# Patient Record
Sex: FEMALE | Race: WHITE | NOT HISPANIC OR LATINO | URBAN - METROPOLITAN AREA
[De-identification: names, ages, dates, MRNs, and addresses within clinical notes are randomized per-mention and may not be internally consistent; named-entity substitution may affect disease eponyms.]

---

## 2017-04-20 ENCOUNTER — OFFICE VISIT (OUTPATIENT)
Dept: FAMILY MEDICINE | Facility: CLINIC | Age: 27
End: 2017-04-20
Payer: COMMERCIAL

## 2017-04-20 VITALS
OXYGEN SATURATION: 98 % | WEIGHT: 163 LBS | HEART RATE: 71 BPM | HEIGHT: 69 IN | DIASTOLIC BLOOD PRESSURE: 70 MMHG | BODY MASS INDEX: 24.14 KG/M2 | TEMPERATURE: 98.4 F | SYSTOLIC BLOOD PRESSURE: 102 MMHG | RESPIRATION RATE: 19 BRPM

## 2017-04-20 DIAGNOSIS — K64.4 EXTERNAL HEMORRHOIDS: Primary | ICD-10-CM

## 2017-04-20 PROCEDURE — 99203 OFFICE O/P NEW LOW 30 MIN: CPT | Performed by: PHYSICIAN ASSISTANT

## 2017-04-20 NOTE — NURSING NOTE
"Chief Complaint   Patient presents with     Gastrointestinal Problem       Initial /70 (BP Location: Left arm, Patient Position: Chair, Cuff Size: Adult Regular)  Pulse 71  Temp 98.4  F (36.9  C) (Oral)  Resp 19  Ht 5' 8.9\" (1.75 m)  Wt 163 lb (73.9 kg)  SpO2 98%  BMI 24.14 kg/m2 Estimated body mass index is 24.14 kg/(m^2) as calculated from the following:    Height as of this encounter: 5' 8.9\" (1.75 m).    Weight as of this encounter: 163 lb (73.9 kg).  Medication Reconciliation: complete     Tracy Martin CMA  "

## 2017-04-20 NOTE — PATIENT INSTRUCTIONS
Encouraged high fiber diet with increase fluids and water.  May use over the counter stool softners- colace and/or senokot.  Anusol HC creams or wipes as needed.  Importance of sitz baths with epsum salts discussed at length.  Return to clinic as needed or with any worsening or changes in symptoms.   Treating Hemorrhoids: Self-Care  Follow your doctor s advice about caring for your hemorrhoids at home. Some treatments help relieve symptoms right away. Others involve making changes in your diet and exercise habits. These can help ease constipation and prevent hemorrhoid symptoms from coming back.    Relieving Symptoms  Your doctor may prescribe anti-inflammatory medication to help ease your symptoms. The following tips will also help relieve pain and swelling.    Take sitz baths. Taking a sitz bath means sitting in a few inches of warm bath water. Soaking for 10 minutes twice a day can provide welcome relief from painful hemorrhoids. It can also help the area stay clean.    Develop good bowel habits. Use the bathroom when you need to. Don t ignore the urge to move your bowels. This can lead to constipation, hard stools, and straining. Also, don t read while on the toilet. Sit only as long as needed. Wipe gently with soft, unscented toilet tissue or baby wipes.    Use ice packs. Placing an ice pack on a thrombosed external hemorrhoid can help relieve pain right away. It will also help reduce the blood clot. Use the ice for 15-20 minutes at a time. Keep a cloth between the ice and your skin to prevent skin damage.    Use other measures. Laxatives and enemas can help ease constipation. But use them only on your doctor s advice. For symptom relief, try using cotton pads soaked in witch hazel. These are available at most drugstores. Over-the-counter hemorrhoid ointments and petroleum jelly can also provide relief.  Add Fiber to Your Diet  Adding fiber to your diet can help relieve constipation by making stools softer and  easier to pass. To increase your fiber intake, your doctor may recommend a bulking agent, such as psyllium. This is a high-fiber supplement available at most grocery stores and drugstores. Eating more fiber-rich foods will also help. There are two types of fiber:    Insoluble fiber is the main ingredient in bulking agents. It s also found in foods such as wheat bran, whole-grain breads, fresh fruits, and vegetables.    Soluble fiber is found in foods such as oat bran. Although soluble fiber is good for you, it may not ease constipation as much as foods high in insoluble fiber.  Drink More Water  Along with a high-fiber diet, drinking more water can help ease constipation. This is because insoluble fiber absorbs water, making stools soft and bulky. Be sure to drink plenty of water throughout the day. Drinking fruit juices, such as prune juice or apple juice, can also help prevent constipation.  Get More Exercise  Regular exercise aids digestion and helps prevent constipation. It s also great for your health. So talk with your doctor about starting an exercise program. Low-impact activities, such as swimming or walking, are good places to start. Take it easy at first. And remember to drink plenty of water when you exercise.  High-Fiber Foods  High-fiber foods offer many benefits. By making your stools softer, they help heal and prevent swollen hemorrhoids. They may also help reduce the risk of colon and rectal cancer. Best of all, they re usually low in calories and taste great. Here are some examples of fiber-rich foods.    Whole grains, such as wheat bran, corn bran, and brown rice.    Vegetables, especially carrots, broccoli, cabbage, and peas.    Fruits, such as apples, bananas, raisins, peaches, and pears.    Nuts and legumes, especially peanuts, lentils, and kidney beans.  Easy Ways to Add Fiber  The tips below offer some simple ways to add more high-fiber foods to your meals.    Start your day with a high-fiber  breakfast. Eat a wheat bran cereal along with a sliced banana. Or, try peanut butter on whole-wheat toast.    Eat carrot sticks for snacks. They re easy to prepare, taste great, and are low in calories.    Use whole-grain breads instead of white bread for sandwiches.    Eat fruits for treats. Try an apple and some raisins instead of a candy bar.     5540-8718 [employer]. [address] [place] [address], [place] [address]. All rights reserved. This information is not intended as a substitute for professional medical care. Always follow your healthcare professional's instructions.

## 2017-04-20 NOTE — PROGRESS NOTES
"  SUBJECTIVE:                                                    Sadie Bishop is a 26 year old female who presents to clinic today for the following health issues:    Gastrointestinal symptoms      Duration: 6 months    Description:           Had blood in stool one time but not since then. No abdominal pain. Anal gets itchy sometimes.     Intensity:  mild    Accompanying signs and symptoms:  BMs soft and firm, no real constipation; painful bowel movements    History  Previous/similar problem: no   Previous evaluation:  none    Aggravating factors: none    Alleviating factors: nothing    Other Therapies tried: over the counter cream with no real changes    No vaginal symptoms or discharge.     Patient sees gyne regularly - last pap 1.5 years, has appointment in a few weeks for follow up.  No current std/hiv concerns.      Problem list and histories reviewed & adjusted, as indicated.  Additional history: as documented    Patient Active Problem List   Diagnosis     Thumb injury     Closed fracture of base of thumb (first) metacarpal     History reviewed. No pertinent surgical history.    Social History   Substance Use Topics     Smoking status: Never Smoker     Smokeless tobacco: Never Used     Alcohol use Yes      Comment: ocassionally     History reviewed. No pertinent family history.      No current outpatient prescriptions on file.     No Known Allergies    Reviewed and updated as needed this visit by clinical staff  Tobacco  Allergies  Meds  Problems  Med Hx  Surg Hx  Fam Hx  Soc Hx        Reviewed and updated as needed this visit by Provider  Allergies  Meds  Problems         ROS:  Constitutional, HEENT, cardiovascular, pulmonary, gi and gu systems are negative, except as otherwise noted.    OBJECTIVE:                                                    /70 (BP Location: Left arm, Patient Position: Chair, Cuff Size: Adult Regular)  Pulse 71  Temp 98.4  F (36.9  C) (Oral)  Resp 19  Ht 5' 8.9\" " (1.75 m)  Wt 163 lb (73.9 kg)  SpO2 98%  BMI 24.14 kg/m2  Body mass index is 24.14 kg/(m^2).  GENERAL: healthy, alert and no distress  RESP: lungs clear to auscultation - no rales, rhonchi or wheezes  CV: regular rate and rhythm, normal S1 S2, no S3 or S4, no murmur, click or rub, no peripheral edema and peripheral pulses strong  ABDOMEN: soft, nontender, no hepatosplenomegaly, no masses and bowel sounds normal  RECTAL (female): normal sphincter tone, no rectal masses; small, well healing, non-inflamed hemorrhoid at 6' o clock location.  PSYCH: mentation appears normal, affect normal/bright    Diagnostic Test Results:  none      ASSESSMENT/PLAN:                                                        ICD-10-CM    1. External hemorrhoids K64.4        Patient Instructions     Encouraged high fiber diet with increase fluids and water.  May use over the counter stool softners- colace and/or senokot.  Anusol HC creams or wipes as needed.  Importance of sitz baths with epsum salts discussed at length.  Return to clinic as needed or with any worsening or changes in symptoms.   Treating Hemorrhoids: Self-Care  Follow your doctor s advice about caring for your hemorrhoids at home. Some treatments help relieve symptoms right away. Others involve making changes in your diet and exercise habits. These can help ease constipation and prevent hemorrhoid symptoms from coming back.    Relieving Symptoms  Your doctor may prescribe anti-inflammatory medication to help ease your symptoms. The following tips will also help relieve pain and swelling.    Take sitz baths. Taking a sitz bath means sitting in a few inches of warm bath water. Soaking for 10 minutes twice a day can provide welcome relief from painful hemorrhoids. It can also help the area stay clean.    Develop good bowel habits. Use the bathroom when you need to. Don t ignore the urge to move your bowels. This can lead to constipation, hard stools, and straining. Also,  don t read while on the toilet. Sit only as long as needed. Wipe gently with soft, unscented toilet tissue or baby wipes.    Use ice packs. Placing an ice pack on a thrombosed external hemorrhoid can help relieve pain right away. It will also help reduce the blood clot. Use the ice for 15-20 minutes at a time. Keep a cloth between the ice and your skin to prevent skin damage.    Use other measures. Laxatives and enemas can help ease constipation. But use them only on your doctor s advice. For symptom relief, try using cotton pads soaked in witch hazel. These are available at most drugstores. Over-the-counter hemorrhoid ointments and petroleum jelly can also provide relief.  Add Fiber to Your Diet  Adding fiber to your diet can help relieve constipation by making stools softer and easier to pass. To increase your fiber intake, your doctor may recommend a bulking agent, such as psyllium. This is a high-fiber supplement available at most grocery stores and drugstores. Eating more fiber-rich foods will also help. There are two types of fiber:    Insoluble fiber is the main ingredient in bulking agents. It s also found in foods such as wheat bran, whole-grain breads, fresh fruits, and vegetables.    Soluble fiber is found in foods such as oat bran. Although soluble fiber is good for you, it may not ease constipation as much as foods high in insoluble fiber.  Drink More Water  Along with a high-fiber diet, drinking more water can help ease constipation. This is because insoluble fiber absorbs water, making stools soft and bulky. Be sure to drink plenty of water throughout the day. Drinking fruit juices, such as prune juice or apple juice, can also help prevent constipation.  Get More Exercise  Regular exercise aids digestion and helps prevent constipation. It s also great for your health. So talk with your doctor about starting an exercise program. Low-impact activities, such as swimming or walking, are good places to  start. Take it easy at first. And remember to drink plenty of water when you exercise.  High-Fiber Foods  High-fiber foods offer many benefits. By making your stools softer, they help heal and prevent swollen hemorrhoids. They may also help reduce the risk of colon and rectal cancer. Best of all, they re usually low in calories and taste great. Here are some examples of fiber-rich foods.    Whole grains, such as wheat bran, corn bran, and brown rice.    Vegetables, especially carrots, broccoli, cabbage, and peas.    Fruits, such as apples, bananas, raisins, peaches, and pears.    Nuts and legumes, especially peanuts, lentils, and kidney beans.  Easy Ways to Add Fiber  The tips below offer some simple ways to add more high-fiber foods to your meals.    Start your day with a high-fiber breakfast. Eat a wheat bran cereal along with a sliced banana. Or, try peanut butter on whole-wheat toast.    Eat carrot sticks for snacks. They re easy to prepare, taste great, and are low in calories.    Use whole-grain breads instead of white bread for sandwiches.    Eat fruits for treats. Try an apple and some raisins instead of a candy bar.     5887-4901 [employer]. [address] [place] [address], [place] [address]. All rights reserved. This information is not intended as a substitute for professional medical care. Always follow your healthcare professional's instructions.      Adore Phillips PA-C  [facility]

## 2017-04-20 NOTE — MR AVS SNAPSHOT
After Visit Summary   4/20/2017    Sadie Bishop    MRN: [id number]           Patient Information     Date Of Birth          1990        Visit Information        Provider Department      4/20/2017 4:00 PM Adore Phillips PA-C [facility]        Care Instructions    Encouraged high fiber diet with increase fluids and water.  May use over the counter stool softners- colace and/or senokot.  Anusol HC creams or wipes as needed.  Importance of sitz baths with epsum salts discussed at length.  Return to clinic as needed or with any worsening or changes in symptoms.   Treating Hemorrhoids: Self-Care  Follow your doctor s advice about caring for your hemorrhoids at home. Some treatments help relieve symptoms right away. Others involve making changes in your diet and exercise habits. These can help ease constipation and prevent hemorrhoid symptoms from coming back.    Relieving Symptoms  Your doctor may prescribe anti-inflammatory medication to help ease your symptoms. The following tips will also help relieve pain and swelling.    Take sitz baths. Taking a sitz bath means sitting in a few inches of warm bath water. Soaking for 10 minutes twice a day can provide welcome relief from painful hemorrhoids. It can also help the area stay clean.    Develop good bowel habits. Use the bathroom when you need to. Don t ignore the urge to move your bowels. This can lead to constipation, hard stools, and straining. Also, don t read while on the toilet. Sit only as long as needed. Wipe gently with soft, unscented toilet tissue or baby wipes.    Use ice packs. Placing an ice pack on a thrombosed external hemorrhoid can help relieve pain right away. It will also help reduce the blood clot. Use the ice for 15-20 minutes at a time. Keep a cloth between the ice and your skin to prevent skin damage.    Use other measures. Laxatives and enemas can help ease constipation. But use them only on your  doctor s advice. For symptom relief, try using cotton pads soaked in witch hazel. These are available at most drugstores. Over-the-counter hemorrhoid ointments and petroleum jelly can also provide relief.  Add Fiber to Your Diet  Adding fiber to your diet can help relieve constipation by making stools softer and easier to pass. To increase your fiber intake, your doctor may recommend a bulking agent, such as psyllium. This is a high-fiber supplement available at most grocery stores and drugstores. Eating more fiber-rich foods will also help. There are two types of fiber:    Insoluble fiber is the main ingredient in bulking agents. It s also found in foods such as wheat bran, whole-grain breads, fresh fruits, and vegetables.    Soluble fiber is found in foods such as oat bran. Although soluble fiber is good for you, it may not ease constipation as much as foods high in insoluble fiber.  Drink More Water  Along with a high-fiber diet, drinking more water can help ease constipation. This is because insoluble fiber absorbs water, making stools soft and bulky. Be sure to drink plenty of water throughout the day. Drinking fruit juices, such as prune juice or apple juice, can also help prevent constipation.  Get More Exercise  Regular exercise aids digestion and helps prevent constipation. It s also great for your health. So talk with your doctor about starting an exercise program. Low-impact activities, such as swimming or walking, are good places to start. Take it easy at first. And remember to drink plenty of water when you exercise.  High-Fiber Foods  High-fiber foods offer many benefits. By making your stools softer, they help heal and prevent swollen hemorrhoids. They may also help reduce the risk of colon and rectal cancer. Best of all, they re usually low in calories and taste great. Here are some examples of fiber-rich foods.    Whole grains, such as wheat bran, corn bran, and brown rice.    Vegetables, especially  "carrots, broccoli, cabbage, and peas.    Fruits, such as apples, bananas, raisins, peaches, and pears.    Nuts and legumes, especially peanuts, lentils, and kidney beans.  Easy Ways to Add Fiber  The tips below offer some simple ways to add more high-fiber foods to your meals.    Start your day with a high-fiber breakfast. Eat a wheat bran cereal along with a sliced banana. Or, try peanut butter on whole-wheat toast.    Eat carrot sticks for snacks. They re easy to prepare, taste great, and are low in calories.    Use whole-grain breads instead of white bread for sandwiches.    Eat fruits for treats. Try an apple and some raisins instead of a candy bar.     3888-1852 [employer]. [address] [place] [address]. All rights reserved. This information is not intended as a substitute for professional medical care. Always follow your healthcare professional's instructions.        Follow-ups after your visit        Who to contact     If you have questions or need follow up information about today's clinic visit or your schedule please contact [facility] directly at [phone].  Normal or non-critical lab and imaging results will be communicated to you by MyChart, letter or phone within 4 business days after the clinic has received the results. If you do not hear from us within 7 days, please contact the clinic through eelusionhart or phone. If you have a critical or abnormal lab result, we will notify you by phone as soon as possible.  Submit refill requests through [employer] or call your pharmacy and they will forward the refill request to us. Please allow 3 business days for your refill to be completed.          Additional Information About Your Visit        eelusion[employer] Information     [employer] lets you send messages to your doctor, view your test results, renew your prescriptions, schedule appointments and more. To sign up, go to [email or web address] . Click on \"Log in\" on the left " "side of the screen, which will take you to the Welcome page. Then click on \"Sign up Now\" on the right side of the page.     You will be asked to enter the access code listed below, as well as some personal information. Please follow the directions to create your username and password.     Your access code is: [email or web address]  Expires: 2017  3:57 PM     Your access code will  in 90 days. If you need help or a new code, please call your [facility] clinic or [phone].        Care EveryWhere ID     This is your Care EveryWhere ID. This could be used by other organizations to access your [facility] medical records  [id number]        Your Vitals Were     Pulse Temperature Respirations Height Pulse Oximetry BMI (Body Mass Index)    71 98.4  F (36.9  C) (Oral) 19 5' 8.9\" (1.75 m) 98% 24.14 kg/m2       Blood Pressure from Last 3 Encounters:   17 102/70    Weight from Last 3 Encounters:   17 163 lb (73.9 kg)   13 155 lb (70.3 kg)   13 155 lb (70.3 kg)              Today, you had the following     No orders found for display       Primary Care Provider    None Specified       No primary provider on file.        Thank you!     Thank you for choosing [facility]  for your care. Our goal is always to provide you with excellent care. Hearing back from our patients is one way we can continue to improve our services. Please take a few minutes to complete the written survey that you may receive in the mail after your visit with us. Thank you!             Your Updated Medication List - Protect others around you: Learn how to safely use, store and throw away your medicines at www.disposemymeds.org.      Notice  As of 2017  4:14 PM    You have not been prescribed any medications.      "

## 2017-06-23 ENCOUNTER — OFFICE VISIT (OUTPATIENT)
Dept: FAMILY MEDICINE | Facility: CLINIC | Age: 27
End: 2017-06-23
Payer: COMMERCIAL

## 2017-06-23 VITALS
TEMPERATURE: 98.6 F | HEART RATE: 63 BPM | SYSTOLIC BLOOD PRESSURE: 104 MMHG | DIASTOLIC BLOOD PRESSURE: 66 MMHG | WEIGHT: 163.25 LBS | RESPIRATION RATE: 20 BRPM | BODY MASS INDEX: 24.18 KG/M2 | OXYGEN SATURATION: 99 %

## 2017-06-23 DIAGNOSIS — K64.4 EXTERNAL HEMORRHOIDS: Primary | ICD-10-CM

## 2017-06-23 PROCEDURE — 99213 OFFICE O/P EST LOW 20 MIN: CPT | Performed by: PHYSICIAN ASSISTANT

## 2017-06-23 NOTE — PATIENT INSTRUCTIONS
Referral updated.    Encouraged high fiber diet with increase fluids and water.  May use over the counter stool softners- colace and/or senokot.  Anusol HC creams or wipes as needed.  Importance of sitz baths with epsum salts discussed at length.  Return to clinic as needed or with any worsening or changes in symptoms.   Treating Hemorrhoids: Self-Care  Follow your doctor s advice about caring for your hemorrhoids at home. Some treatments help relieve symptoms right away. Others involve making changes in your diet and exercise habits. These can help ease constipation and prevent hemorrhoid symptoms from coming back.    Relieving Symptoms  Your doctor may prescribe anti-inflammatory medication to help ease your symptoms. The following tips will also help relieve pain and swelling.    Take sitz baths. Taking a sitz bath means sitting in a few inches of warm bath water. Soaking for 10 minutes twice a day can provide welcome relief from painful hemorrhoids. It can also help the area stay clean.    Develop good bowel habits. Use the bathroom when you need to. Don t ignore the urge to move your bowels. This can lead to constipation, hard stools, and straining. Also, don t read while on the toilet. Sit only as long as needed. Wipe gently with soft, unscented toilet tissue or baby wipes.    Use ice packs. Placing an ice pack on a thrombosed external hemorrhoid can help relieve pain right away. It will also help reduce the blood clot. Use the ice for 15-20 minutes at a time. Keep a cloth between the ice and your skin to prevent skin damage.    Use other measures. Laxatives and enemas can help ease constipation. But use them only on your doctor s advice. For symptom relief, try using cotton pads soaked in witch hazel. These are available at most drugstores. Over-the-counter hemorrhoid ointments and petroleum jelly can also provide relief.  Add Fiber to Your Diet  Adding fiber to your diet can help relieve constipation by  making stools softer and easier to pass. To increase your fiber intake, your doctor may recommend a bulking agent, such as psyllium. This is a high-fiber supplement available at most grocery stores and drugstores. Eating more fiber-rich foods will also help. There are two types of fiber:    Insoluble fiber is the main ingredient in bulking agents. It s also found in foods such as wheat bran, whole-grain breads, fresh fruits, and vegetables.    Soluble fiber is found in foods such as oat bran. Although soluble fiber is good for you, it may not ease constipation as much as foods high in insoluble fiber.  Drink More Water  Along with a high-fiber diet, drinking more water can help ease constipation. This is because insoluble fiber absorbs water, making stools soft and bulky. Be sure to drink plenty of water throughout the day. Drinking fruit juices, such as prune juice or apple juice, can also help prevent constipation.  Get More Exercise  Regular exercise aids digestion and helps prevent constipation. It s also great for your health. So talk with your doctor about starting an exercise program. Low-impact activities, such as swimming or walking, are good places to start. Take it easy at first. And remember to drink plenty of water when you exercise.  High-Fiber Foods  High-fiber foods offer many benefits. By making your stools softer, they help heal and prevent swollen hemorrhoids. They may also help reduce the risk of colon and rectal cancer. Best of all, they re usually low in calories and taste great. Here are some examples of fiber-rich foods.    Whole grains, such as wheat bran, corn bran, and brown rice.    Vegetables, especially carrots, broccoli, cabbage, and peas.    Fruits, such as apples, bananas, raisins, peaches, and pears.    Nuts and legumes, especially peanuts, lentils, and kidney beans.  Easy Ways to Add Fiber  The tips below offer some simple ways to add more high-fiber foods to your meals.    Start  your day with a high-fiber breakfast. Eat a wheat bran cereal along with a sliced banana. Or, try peanut butter on whole-wheat toast.    Eat carrot sticks for snacks. They re easy to prepare, taste great, and are low in calories.    Use whole-grain breads instead of white bread for sandwiches.    Eat fruits for treats. Try an apple and some raisins instead of a candy bar.     4506-5272 [employer]. [address] [place] [address], [place] [address]. All rights reserved. This information is not intended as a substitute for professional medical care. Always follow your healthcare professional's instructions.

## 2017-06-23 NOTE — NURSING NOTE
"Chief Complaint   Patient presents with     Rectal Problem       Initial /66 (BP Location: Left arm, Patient Position: Sitting, Cuff Size: Adult Regular)  Pulse 63  Temp 98.6  F (37  C) (Oral)  Resp 20  Wt 163 lb 4 oz (74 kg)  SpO2 99%  BMI 24.18 kg/m2 Estimated body mass index is 24.18 kg/(m^2) as calculated from the following:    Height as of 4/20/17: 5' 8.9\" (1.75 m).    Weight as of this encounter: 163 lb 4 oz (74 kg).  Medication Reconciliation: complete     Tracy Martin CMA  "

## 2017-06-23 NOTE — MR AVS SNAPSHOT
After Visit Summary   6/23/2017    Sadie Bishop    MRN: [id number]           Patient Information     Date Of Birth          1990        Visit Information        Provider Department      6/23/2017 7:40 AM Adore Phillips PA-C [facility]        Today's Diagnoses     External hemorrhoids    -  1      Care Instructions    Referral updated.    Encouraged high fiber diet with increase fluids and water.  May use over the counter stool softners- colace and/or senokot.  Anusol HC creams or wipes as needed.  Importance of sitz baths with epsum salts discussed at length.  Return to clinic as needed or with any worsening or changes in symptoms.   Treating Hemorrhoids: Self-Care  Follow your doctor s advice about caring for your hemorrhoids at home. Some treatments help relieve symptoms right away. Others involve making changes in your diet and exercise habits. These can help ease constipation and prevent hemorrhoid symptoms from coming back.    Relieving Symptoms  Your doctor may prescribe anti-inflammatory medication to help ease your symptoms. The following tips will also help relieve pain and swelling.    Take sitz baths. Taking a sitz bath means sitting in a few inches of warm bath water. Soaking for 10 minutes twice a day can provide welcome relief from painful hemorrhoids. It can also help the area stay clean.    Develop good bowel habits. Use the bathroom when you need to. Don t ignore the urge to move your bowels. This can lead to constipation, hard stools, and straining. Also, don t read while on the toilet. Sit only as long as needed. Wipe gently with soft, unscented toilet tissue or baby wipes.    Use ice packs. Placing an ice pack on a thrombosed external hemorrhoid can help relieve pain right away. It will also help reduce the blood clot. Use the ice for 15-20 minutes at a time. Keep a cloth between the ice and your skin to prevent skin damage.    Use other  measures. Laxatives and enemas can help ease constipation. But use them only on your doctor s advice. For symptom relief, try using cotton pads soaked in witch hazel. These are available at most drugstores. Over-the-counter hemorrhoid ointments and petroleum jelly can also provide relief.  Add Fiber to Your Diet  Adding fiber to your diet can help relieve constipation by making stools softer and easier to pass. To increase your fiber intake, your doctor may recommend a bulking agent, such as psyllium. This is a high-fiber supplement available at most grocery stores and drugstores. Eating more fiber-rich foods will also help. There are two types of fiber:    Insoluble fiber is the main ingredient in bulking agents. It s also found in foods such as wheat bran, whole-grain breads, fresh fruits, and vegetables.    Soluble fiber is found in foods such as oat bran. Although soluble fiber is good for you, it may not ease constipation as much as foods high in insoluble fiber.  Drink More Water  Along with a high-fiber diet, drinking more water can help ease constipation. This is because insoluble fiber absorbs water, making stools soft and bulky. Be sure to drink plenty of water throughout the day. Drinking fruit juices, such as prune juice or apple juice, can also help prevent constipation.  Get More Exercise  Regular exercise aids digestion and helps prevent constipation. It s also great for your health. So talk with your doctor about starting an exercise program. Low-impact activities, such as swimming or walking, are good places to start. Take it easy at first. And remember to drink plenty of water when you exercise.  High-Fiber Foods  High-fiber foods offer many benefits. By making your stools softer, they help heal and prevent swollen hemorrhoids. They may also help reduce the risk of colon and rectal cancer. Best of all, they re usually low in calories and taste great. Here are some examples of fiber-rich  foods.    Whole grains, such as wheat bran, corn bran, and brown rice.    Vegetables, especially carrots, broccoli, cabbage, and peas.    Fruits, such as apples, bananas, raisins, peaches, and pears.    Nuts and legumes, especially peanuts, lentils, and kidney beans.  Easy Ways to Add Fiber  The tips below offer some simple ways to add more high-fiber foods to your meals.    Start your day with a high-fiber breakfast. Eat a wheat bran cereal along with a sliced banana. Or, try peanut butter on whole-wheat toast.    Eat carrot sticks for snacks. They re easy to prepare, taste great, and are low in calories.    Use whole-grain breads instead of white bread for sandwiches.    Eat fruits for treats. Try an apple and some raisins instead of a candy bar.     3105-9530 [employer]. [address] [place] [address]. All rights reserved. This information is not intended as a substitute for professional medical care. Always follow your healthcare professional's instructions.          Follow-ups after your visit        Additional Services     COLORECTAL SURGERY REFERRAL       Your provider has referred you to: [facility]: [facility] [phone]  [email or web address]  [facility]: [facility] [place] [phone]   [email or web address]  [facility]: [facility], .[facility] [phone]   [email or web address]    Referral Reason(s): Hemorrhoids  Special Concerns: None  This referral is: Elective (week +)  It is OK to leave a message on patient's voicemail.    Please be aware that coverage of these services is subject to the terms and limitations of your health insurance plan.  Call member services at your health plan with any benefit or coverage questions.      Please bring the following with you to your appointment:    (1) Any X-Rays, CTs or MRIs which have  "been performed.  Contact the facility where they were done to arrange for  prior to your scheduled appointment.    (2) List of current medications  (3) This referral request   (4) Any documents/labs given to you for this referral                  Who to contact     If you have questions or need follow up information about today's clinic visit or your schedule please contact [facility] directly at [phone].  Normal or non-critical lab and imaging results will be communicated to you by dotHIV[employer], letter or phone within 4 business days after the clinic has received the results. If you do not hear from us within 7 days, please contact the clinic through [employer]HIV[employer] or phone. If you have a critical or abnormal lab result, we will notify you by phone as soon as possible.  Submit refill requests through [employer] or call your pharmacy and they will forward the refill request to us. Please allow 3 business days for your refill to be completed.          Additional Information About Your Visit        [employer] Information     [facility] [facility] lets you send messages to your doctor, view your test results, renew your prescriptions, schedule appointments and more. To sign up, go to [email or web address] . Click on \"Log in\" on the left side of the screen, which will take you to the Welcome page. Then click on \"Sign up Now\" on the right side of the page.     You will be asked to enter the access code listed below, as well as some personal information. Please follow the directions to create your username and password.     Your access code is: [email or web address]  Expires: 2017  3:57 PM     Your access code will  in 90 days. If you need help or a new code, please call your [facility] clinic or [phone].        Care EveryWhere ID     This is your Care EveryWhere ID. This could be used by other organizations to access your [facility] medical records  [id number]        Your Vitals Were     Pulse Temperature Respirations " Pulse Oximetry BMI (Body Mass Index)       63 98.6  F (37  C) (Oral) 20 99% 24.18 kg/m2        Blood Pressure from Last 3 Encounters:   06/23/17 104/66   04/20/17 102/70    Weight from Last 3 Encounters:   06/23/17 163 lb 4 oz (74 kg)   04/20/17 163 lb (73.9 kg)   08/14/13 155 lb (70.3 kg)              We Performed the Following     COLORECTAL SURGERY REFERRAL        Primary Care Provider    None Specified       No primary provider on file.        Equal Access to Services     LEMUEL MURRAY : Hadii suman gomezo Somata, wacarlosda luqadaha, yoav kaalmablade wood, david burris . So [facility] [phone].    ATENCIÓN: Si habla español, tiene a emmanuel disposición servicios gratuitos de asistencia lingüística. Llame al [phone].    We comply with applicable federal civil rights laws and [place] laws. We do not discriminate on the basis of race, color, national origin, age, disability sex, sexual orientation or gender identity.            Thank you!     Thank you for choosing [facility] [place]  for your care. Our goal is always to provide you with excellent care. Hearing back from our patients is one way we can continue to improve our services. Please take a few minutes to complete the written survey that you may receive in the mail after your visit with us. Thank you!             Your Updated Medication List - Protect others around you: Learn how to safely use, store and throw away your medicines at www.disposemymeds.org.      Notice  As of 6/23/2017  7:50 AM    You have not been prescribed any medications.

## 2017-06-23 NOTE — PROGRESS NOTES
SUBJECTIVE:                                                    Sadie Bishop is a 26 year old female who presents to clinic today for the following health issues:    Gastrointestinal symptoms      Duration: 6 months    Description:           Had blood in stool one time but not since then. No abdominal pain. Anal gets itchy sometimes.     Intensity:  mild    Accompanying signs and symptoms:  BMs soft and firm, no real constipation; painful bowel movements    History  Previous/similar problem: no   Previous evaluation:  none    Aggravating factors: none    Alleviating factors: nothing    Other Therapies tried: over the counter cream with no real changes    No vaginal symptoms or discharge.     Patient sees gyne regularly - last pap 1.5 years, has appointment in a few weeks for follow up.  No current std/hiv concerns.      Problem list and histories reviewed & adjusted, as indicated.  Additional history: as documented    Patient Active Problem List   Diagnosis     Thumb injury     Closed fracture of base of thumb (first) metacarpal     History reviewed. No pertinent surgical history.    Social History   Substance Use Topics     Smoking status: Never Smoker     Smokeless tobacco: Never Used     Alcohol use Yes      Comment: ocassionally     History reviewed. No pertinent family history.      No current outpatient prescriptions on file.     No Known Allergies    Reviewed and updated as needed this visit by clinical staff  Tobacco  Allergies  Med Hx  Surg Hx  Fam Hx  Soc Hx      Reviewed and updated as needed this visit by Provider         ROS:  Constitutional, HEENT, cardiovascular, pulmonary, gi and gu systems are negative, except as otherwise noted.    OBJECTIVE:                                                    /66 (BP Location: Left arm, Patient Position: Sitting, Cuff Size: Adult Regular)  Pulse 63  Temp 98.6  F (37  C) (Oral)  Resp 20  Wt 163 lb 4 oz (74 kg)  SpO2 99%  BMI 24.18 kg/m2  Body  mass index is 24.18 kg/(m^2).  GENERAL: healthy, alert and no distress  RESP: lungs clear to auscultation - no rales, rhonchi or wheezes  CV: regular rate and rhythm, normal S1 S2, no S3 or S4, no murmur, click or rub, no peripheral edema and peripheral pulses strong  ABDOMEN: soft, nontender, no hepatosplenomegaly, no masses and bowel sounds normal  RECTAL (female): normal sphincter tone, no rectal masses; small, well healing, non-inflamed hemorrhoid at 6' o clock location.  PSYCH: mentation appears normal, affect normal/bright    Diagnostic Test Results:  none      ASSESSMENT/PLAN:                                                        ICD-10-CM    1. External hemorrhoids K64.4 COLORECTAL SURGERY REFERRAL       Patient Instructions     Referral updated.    Encouraged high fiber diet with increase fluids and water.  May use over the counter stool softners- colace and/or senokot.  Anusol HC creams or wipes as needed.  Importance of sitz baths with epsum salts discussed at length.  Return to clinic as needed or with any worsening or changes in symptoms.   Treating Hemorrhoids: Self-Care  Follow your doctor s advice about caring for your hemorrhoids at home. Some treatments help relieve symptoms right away. Others involve making changes in your diet and exercise habits. These can help ease constipation and prevent hemorrhoid symptoms from coming back.    Relieving Symptoms  Your doctor may prescribe anti-inflammatory medication to help ease your symptoms. The following tips will also help relieve pain and swelling.    Take sitz baths. Taking a sitz bath means sitting in a few inches of warm bath water. Soaking for 10 minutes twice a day can provide welcome relief from painful hemorrhoids. It can also help the area stay clean.    Develop good bowel habits. Use the bathroom when you need to. Don t ignore the urge to move your bowels. This can lead to constipation, hard stools, and straining. Also, don t read while on  the toilet. Sit only as long as needed. Wipe gently with soft, unscented toilet tissue or baby wipes.    Use ice packs. Placing an ice pack on a thrombosed external hemorrhoid can help relieve pain right away. It will also help reduce the blood clot. Use the ice for 15-20 minutes at a time. Keep a cloth between the ice and your skin to prevent skin damage.    Use other measures. Laxatives and enemas can help ease constipation. But use them only on your doctor s advice. For symptom relief, try using cotton pads soaked in witch hazel. These are available at most drugstores. Over-the-counter hemorrhoid ointments and petroleum jelly can also provide relief.  Add Fiber to Your Diet  Adding fiber to your diet can help relieve constipation by making stools softer and easier to pass. To increase your fiber intake, your doctor may recommend a bulking agent, such as psyllium. This is a high-fiber supplement available at most grocery stores and drugstores. Eating more fiber-rich foods will also help. There are two types of fiber:    Insoluble fiber is the main ingredient in bulking agents. It s also found in foods such as wheat bran, whole-grain breads, fresh fruits, and vegetables.    Soluble fiber is found in foods such as oat bran. Although soluble fiber is good for you, it may not ease constipation as much as foods high in insoluble fiber.  Drink More Water  Along with a high-fiber diet, drinking more water can help ease constipation. This is because insoluble fiber absorbs water, making stools soft and bulky. Be sure to drink plenty of water throughout the day. Drinking fruit juices, such as prune juice or apple juice, can also help prevent constipation.  Get More Exercise  Regular exercise aids digestion and helps prevent constipation. It s also great for your health. So talk with your doctor about starting an exercise program. Low-impact activities, such as swimming or walking, are good places to start. Take it easy at  first. And remember to drink plenty of water when you exercise.  High-Fiber Foods  High-fiber foods offer many benefits. By making your stools softer, they help heal and prevent swollen hemorrhoids. They may also help reduce the risk of colon and rectal cancer. Best of all, they re usually low in calories and taste great. Here are some examples of fiber-rich foods.    Whole grains, such as wheat bran, corn bran, and brown rice.    Vegetables, especially carrots, broccoli, cabbage, and peas.    Fruits, such as apples, bananas, raisins, peaches, and pears.    Nuts and legumes, especially peanuts, lentils, and kidney beans.  Easy Ways to Add Fiber  The tips below offer some simple ways to add more high-fiber foods to your meals.    Start your day with a high-fiber breakfast. Eat a wheat bran cereal along with a sliced banana. Or, try peanut butter on whole-wheat toast.    Eat carrot sticks for snacks. They re easy to prepare, taste great, and are low in calories.    Use whole-grain breads instead of white bread for sandwiches.    Eat fruits for treats. Try an apple and some raisins instead of a candy bar.     1031-3069 [employer]. [address] [place] [address], [place] [address]. All rights reserved. This information is not intended as a substitute for professional medical care. Always follow your healthcare professional's instructions.      Adore Phillips PA-C  [facility] [place] [facility] [place]

## 2017-10-04 ENCOUNTER — TRANSFERRED RECORDS (OUTPATIENT)
Dept: HEALTH INFORMATION MANAGEMENT | Facility: CLINIC | Age: 27
End: 2017-10-04

## 2021-09-16 ENCOUNTER — TRANSFERRED RECORDS (OUTPATIENT)
Dept: FAMILY MEDICINE | Facility: CLINIC | Age: 31
End: 2021-09-16

## 2021-09-16 LAB
HPV ABSTRACT: NORMAL
PAP-ABSTRACT: NORMAL

## 2022-10-20 ENCOUNTER — OFFICE VISIT (OUTPATIENT)
Dept: FAMILY MEDICINE | Facility: CLINIC | Age: 32
End: 2022-10-20

## 2022-10-20 VITALS
SYSTOLIC BLOOD PRESSURE: 120 MMHG | OXYGEN SATURATION: 98 % | DIASTOLIC BLOOD PRESSURE: 72 MMHG | HEART RATE: 70 BPM | BODY MASS INDEX: 25.13 KG/M2 | WEIGHT: 175.5 LBS | RESPIRATION RATE: 16 BRPM | HEIGHT: 70 IN

## 2022-10-20 DIAGNOSIS — Z00.00 ROUTINE GENERAL MEDICAL EXAMINATION AT A HEALTH CARE FACILITY: Primary | ICD-10-CM

## 2022-10-20 DIAGNOSIS — M25.511 CHRONIC RIGHT SHOULDER PAIN: ICD-10-CM

## 2022-10-20 DIAGNOSIS — Z71.89 ADVANCED CARE PLANNING/COUNSELING DISCUSSION: ICD-10-CM

## 2022-10-20 DIAGNOSIS — Z13.228 SCREENING FOR ENDOCRINE, METABOLIC AND IMMUNITY DISORDER: ICD-10-CM

## 2022-10-20 DIAGNOSIS — G89.29 CHRONIC RIGHT SHOULDER PAIN: ICD-10-CM

## 2022-10-20 DIAGNOSIS — Z13.0 SCREENING FOR ENDOCRINE, METABOLIC AND IMMUNITY DISORDER: ICD-10-CM

## 2022-10-20 DIAGNOSIS — D50.8 IRON DEFICIENCY ANEMIA SECONDARY TO INADEQUATE DIETARY IRON INTAKE: ICD-10-CM

## 2022-10-20 DIAGNOSIS — Z30.41 ENCOUNTER FOR SURVEILLANCE OF CONTRACEPTIVE PILLS: ICD-10-CM

## 2022-10-20 DIAGNOSIS — Z13.220 SCREENING FOR LIPID DISORDERS: ICD-10-CM

## 2022-10-20 DIAGNOSIS — Z13.29 SCREENING FOR ENDOCRINE, METABOLIC AND IMMUNITY DISORDER: ICD-10-CM

## 2022-10-20 DIAGNOSIS — H65.02 NON-RECURRENT ACUTE SEROUS OTITIS MEDIA OF LEFT EAR: ICD-10-CM

## 2022-10-20 DIAGNOSIS — Z13.1 SCREENING FOR DIABETES MELLITUS: ICD-10-CM

## 2022-10-20 PROBLEM — M25.311 SHOULDER INSTABILITY, RIGHT: Status: ACTIVE | Noted: 2021-07-12

## 2022-10-20 LAB
% GRANULOCYTES: 71.6 % (ref 42.2–75.2)
CHOL/HDL RATIO (RMG): 5.4 MG/DL (ref 0–4.5)
CHOLESTEROL: 203 MG/DL (ref 100–199)
HCT VFR BLD AUTO: 34.5 % (ref 35–46)
HDL (RMG): 37 MG/DL (ref 40–?)
HEMOGLOBIN: 12.2 G/DL (ref 11.8–15.5)
LDL CALCULATED (RMG): 131 MG/DL (ref 0–130)
LYMPHOCYTES NFR BLD AUTO: 22.1 % (ref 20.5–51.1)
MCH RBC QN AUTO: 30.9 PG (ref 27–31)
MCHC RBC AUTO-ENTMCNC: 35.3 G/DL (ref 33–37)
MCV RBC AUTO: 87.6 FL (ref 80–100)
MONOCYTES NFR BLD AUTO: 6.3 % (ref 1.7–9.3)
PLATELET # BLD AUTO: 333 K/UL (ref 140–450)
RBC # BLD AUTO: 3.94 X10/CMM (ref 3.7–5.2)
TRIGLYCERIDES (RMG): 169 MG/DL (ref 0–149)
WBC # BLD AUTO: 6.8 X10/CMM (ref 3.8–11)

## 2022-10-20 PROCEDURE — 80061 LIPID PANEL: CPT | Mod: QW | Performed by: NURSE PRACTITIONER

## 2022-10-20 PROCEDURE — 36415 COLL VENOUS BLD VENIPUNCTURE: CPT | Performed by: NURSE PRACTITIONER

## 2022-10-20 PROCEDURE — 99385 PREV VISIT NEW AGE 18-39: CPT | Performed by: NURSE PRACTITIONER

## 2022-10-20 PROCEDURE — 85025 COMPLETE CBC W/AUTO DIFF WBC: CPT | Performed by: NURSE PRACTITIONER

## 2022-10-20 RX ORDER — NORETHINDRONE ACETATE AND ETHINYL ESTRADIOL AND FERROUS FUMARATE 1.5-30(21)
1 KIT ORAL DAILY
Qty: 84 TABLET | Refills: 4
Start: 2022-10-20 | End: 2023-01-25

## 2022-10-20 RX ORDER — NORETHINDRONE ACETATE AND ETHINYL ESTRADIOL AND FERROUS FUMARATE 1.5-30(21)
1 KIT ORAL DAILY
COMMUNITY
Start: 2022-08-05 | End: 2022-10-20

## 2022-10-20 RX ORDER — FLUTICASONE PROPIONATE 50 MCG
SPRAY, SUSPENSION (ML) NASAL
COMMUNITY
Start: 2022-10-17 | End: 2022-10-20

## 2022-10-20 RX ORDER — FLUTICASONE PROPIONATE 50 MCG
1 SPRAY, SUSPENSION (ML) NASAL DAILY
Qty: 18.2 ML | Refills: 1
Start: 2022-10-20 | End: 2023-03-28

## 2022-10-20 NOTE — PROGRESS NOTES
" SUBJECTIVE:   CC: Sadie Bishop is an 32 year old woman who presents for preventive health visit.       Patient has been advised of split billing requirements and indicates understanding:   Healthy Habits:    Do you get at least three servings of calcium containing foods daily (dairy, green leafy vegetables, etc.)? yes    Amount of exercise or daily activities, outside of work: 5 day(s) per week    Problems taking medications regularly No    Medication side effects: No    Have you had an eye exam in the past two years? yes    Do you see a dentist twice per year? no    Do you have sleep apnea, excessive snoring or daytime drowsiness?no    Right shoulder - intermittent pain since July 2021. Went to 2-3 physical therapy appointments but not super helpful. Pain occasionally will radiate down to elbow. Denies numbness/tingling in arm. \"Tweaked\" shoulder 2 days ago when reaching for something and wasn't able to lift her arm above shoulder height for a day. NSAID medications helpful with symptoms.    Sexually active with one male partner. Taking combination oral contraceptive. No history of blood clots, migraine with aura. Periods used to be heavy, every 3 weeks but now are better. History of iron deficiency anemia. Has never been able to donate blood. Trying to eat higher iron diet.     Feels like she might have left ear infection. Taking Sudafed and using Flonase    Today's PHQ-2 Score:   PHQ-2 ( 1999 [employer]) 10/20/2022 6/23/2017   Q1: Little interest or pleasure in doing things 0 0   Q2: Feeling down, depressed or hopeless 0 0   PHQ-2 Score 0 0     Abuse: Current or Past(Physical, Sexual or Emotional)- No  Do you feel safe in your environment? Yes      Social History     Tobacco Use     Smoking status: Never     Smokeless tobacco: Never   Substance Use Topics     Alcohol use: Yes     Comment: ocassionally     If you drink alcohol do you typically have >3 drinks per day or >7 drinks per week? No                   " "  Reviewed orders with patient.  Reviewed health maintenance and updated orders accordingly - Yes  Lab work is in process    FHS-7: No flowsheet data found.  Patient under 40 years of age: Routine Mammogram Screening not recommended.   Pertinent mammograms are reviewed under the imaging tab.  History of abnormal Pap smear: NO - age 30-65 PAP every 5 years with negative HPV co-testing recommended    ROS:  CONSTITUTIONAL: NEGATIVE for fever, chills, change in weight  INTEGUMENTARU/SKIN: NEGATIVE for worrisome rashes, moles or lesions  EYES: NEGATIVE for vision changes or irritation  ENT: NEGATIVE for ear, mouth and throat problems  RESP: NEGATIVE for significant cough or SOB  BREAST: NEGATIVE for masses, tenderness or discharge  CV: NEGATIVE for chest pain, palpitations or peripheral edema  GI: NEGATIVE for nausea, abdominal pain, heartburn, or change in bowel habits  : NEGATIVE for unusual urinary or vaginal symptoms. Periods are regular.  MUSCULOSKELETAL: right shoulder pain  NEURO: NEGATIVE for weakness, dizziness or paresthesias  PSYCHIATRIC: NEGATIVE for changes in mood or affect    OBJECTIVE:   /72   Pulse 70   Resp 16   Ht 1.765 m (5' 9.5\")   Wt 79.6 kg (175 lb 8 oz)   LMP 10/11/2022 (Exact Date)   SpO2 98%   BMI 25.55 kg/m    EXAM:  GENERAL: healthy, alert and no distress  EYES: Eyes grossly normal to inspection, PERRL and conjunctivae and sclerae normal  HENT: Right ear canal & TM normal; Left TM with small serous effusion, nose and mouth without ulcers or lesions  NECK: no adenopathy, no asymmetry, masses, or scars and thyroid normal to palpation  RESP: lungs clear to auscultation - no rales, rhonchi or wheezes  BREAST: declined exam  CV: regular rate and rhythm, normal S1 S2, no S3 or S4, no murmur, click or rub, no peripheral edema and peripheral pulses strong  ABDOMEN: soft, nontender, no hepatosplenomegaly, no masses and bowel sounds normal   (female): declined exam  MS: right shoulder " normal anatomy, normal ROM, no pinpoint tenderness  SKIN: no suspicious lesions or rashes  NEURO: Normal strength and tone, mentation intact and speech normal  PSYCH: normal affect & mood    Diagnostic Test Results:  Labs reviewed in Epic  Results for orders placed or performed in visit on 10/20/22 (from the past 24 hour(s))   Lipid Profile ([facility] [place] [facility])   Result Value Ref Range    CHOLESTEROL 203 (A) 100 - 199 mg/dl    HDL 37 (A) 40 mg/dl    TRIGLYCERIDES ([facility]) 169 (A) 0 - 149 mg/dl    LDL CALCULATED ([facility]) 131 (A) 0 - 130 mg/dl    CHOL/HDL RATIO ([facility]) 5.4 (A) 0.0 - 4.5 mg/dl   CBC with Diff/Plt ([facility] [place])   Result Value Ref Range    WBC x10/cmm 6.8 3.8 - 11.0 x10/cmm    % Lymphocytes 22.1 20.5 - 51.1 %    % Monocytes 6.3 1.7 - 9.3 %    % Granulocytes 71.6 42.2 - 75.2 %    RBC x10/cmm 3.94 3.7 - 5.2 x10/cmm    Hemoglobin 12.2 11.8 - 15.5 g/dl    Hematocrit 34.5 (A) 35 - 46 %    MCV 87.6 80 - 100 fL    MCH 30.9 27.0 - 31.0 pg    MCHC 35.3 33.0 - 37.0 g/dL    Platelet Count 333 140 - 450 K/uL       ASSESSMENT/PLAN:   Sadie was seen today for physical, establish care and shoulder pain.    Diagnoses and all orders for this visit:    Routine general medical examination at a health care facility  Age-appropriate preventative health maintenance along with diet, exercise and healthy weight discussed. JOANN signed for pap results. States done last year and was normal    Chronic right shoulder pain  -     Radiology Referral; Future  MRI of shoulder ordered. Possible referral to ortho and/or physical therapy based on results    Encounter for surveillance of contraceptive pills  -     JUNEL FE 1.5/30 1.5-30 MG-MCG tablet; Take 1 tablet by mouth daily  Doing well with current combination oral contraceptive without side effects. Continue without changes    Advanced care planning/counseling discussion    Screening for diabetes mellitus  -     Comp. Metabolic Panel (14) ([facility])  -     VENOUS COLLECTION    Screening for lipid disorders  -     " Lipid Profile ([facility])  -     VENOUS COLLECTION    Screening for endocrine, metabolic and immunity disorder  -     CBC with Diff/Plt ([facility])  -     TSH ([facility])  -     VENOUS COLLECTION    Iron deficiency anemia secondary to inadequate dietary iron intake  -     CBC with Diff/Plt ([facility])  -     Ferritin  Serum ([facility])  -     VENOUS COLLECTION    Non-recurrent acute serous otitis media of left ear  -     fluticasone (FLONASE) 50 MCG/ACT nasal spray; Spray 1 spray into both nostrils daily  Also takes Sudafed as needed. Feels like she can't pop it.      Patient has been advised of split billing requirements and indicates understanding: Yes  COUNSELING:   Reviewed preventive health counseling, as reflected in patient instructions  Special attention given to:        Regular exercise       Healthy diet/nutrition       Vision screening       Contraception    Estimated body mass index is 25.55 kg/m  as calculated from the following:    Height as of this encounter: 1.765 m (5' 9.5\").    Weight as of this encounter: 79.6 kg (175 lb 8 oz).    Weight management plan: Discussed healthy diet and exercise guidelines    She reports that she has never smoked. She has never used smokeless tobacco.    GURVINDER Larios [facility]  "

## 2022-10-21 LAB
ALBUMIN SERPL-MCNC: 4.3 G/DL (ref 3.9–5)
ALBUMIN/GLOB SERPL: 1.6 {RATIO} (ref 1.2–2.2)
ALP SERPL-CCNC: 42 IU/L (ref 44–121)
ALT SERPL-CCNC: 13 IU/L (ref 0–32)
AST SERPL-CCNC: 11 IU/L (ref 0–40)
BILIRUB SERPL-MCNC: 0.4 MG/DL (ref 0–1.2)
BUN SERPL-MCNC: 10 MG/DL (ref 6–20)
BUN/CREATININE RATIO: 12 (ref 9–23)
CALCIUM SERPL-MCNC: 8.8 MG/DL (ref 8.7–10.2)
CHLORIDE SERPLBLD-SCNC: 107 MMOL/L (ref 96–106)
CREAT SERPL-MCNC: 0.85 MG/DL (ref 0.57–1)
EGFR: 94 ML/MIN/1.73
FERRITIN SERPL-MCNC: 15 NG/ML (ref 15–150)
GLOBULIN, TOTAL: 2.7 G/DL (ref 1.5–4.5)
GLUCOSE SERPL-MCNC: 83 MG/DL (ref 70–99)
POTASSIUM SERPL-SCNC: 5 MMOL/L (ref 3.5–5.2)
PROT SERPL-MCNC: 7 G/DL (ref 6–8.5)
SODIUM SERPL-SCNC: 141 MMOL/L (ref 134–144)
TOTAL CO2: 22 MMOL/L (ref 20–29)
TSH BLD-ACNC: 2.04 UIU/ML (ref 0.45–4.5)

## 2022-11-04 ENCOUNTER — TRANSFERRED RECORDS (OUTPATIENT)
Dept: FAMILY MEDICINE | Facility: CLINIC | Age: 32
End: 2022-11-04

## 2022-11-05 ENCOUNTER — MYC MEDICAL ADVICE (OUTPATIENT)
Dept: FAMILY MEDICINE | Facility: CLINIC | Age: 32
End: 2022-11-05

## 2022-11-05 DIAGNOSIS — M25.511 CHRONIC RIGHT SHOULDER PAIN: Primary | ICD-10-CM

## 2022-11-05 DIAGNOSIS — G89.29 CHRONIC RIGHT SHOULDER PAIN: Primary | ICD-10-CM

## 2022-11-09 NOTE — TELEPHONE ENCOUNTER
Patient returned nurse's call and would like to proceed with PT for her shoulder. Acute symptoms have calmed down and now back to baseline discomfort has had for over a year.   Plan: PT referral placed in [facility] for [facility]. Patient given their contact info. Patient will call us if symptoms worsen or fail to improve.    Ashlie Barbour RN

## 2022-11-16 ENCOUNTER — THERAPY VISIT (OUTPATIENT)
Dept: PHYSICAL THERAPY | Facility: CLINIC | Age: 32
End: 2022-11-16
Attending: NURSE PRACTITIONER
Payer: COMMERCIAL

## 2022-11-16 DIAGNOSIS — G89.29 CHRONIC RIGHT SHOULDER PAIN: ICD-10-CM

## 2022-11-16 DIAGNOSIS — M25.511 CHRONIC RIGHT SHOULDER PAIN: ICD-10-CM

## 2022-11-16 PROCEDURE — 97110 THERAPEUTIC EXERCISES: CPT | Mod: GP

## 2022-11-16 PROCEDURE — 97161 PT EVAL LOW COMPLEX 20 MIN: CPT | Mod: GP

## 2022-11-16 NOTE — PROGRESS NOTES
"Physical Therapy Initial Evaluation  Therapist Impression: Sadie is a 32 year old year old female referred to physical therapy by GURVINDER Larios, CNP for treatment of R shoulder pain. Subjective history and objective findings are consistent with RC tendinosis, scapular weakness/dyskinesis. Due to these impairments, patient has difficulty with certain reaching and lifting activties, lying on her R side. Patient will benefit from skilled PT to address impairments/limitations in order to reach patient's goals, facilitate return to prior level of function, and maximize participation.    KEY FINDINGS:  1. Full shoulder AROM  2. 5/5 shoulder strength  3. Scapular weakness    Subjective:  The history is provided by the patient. No  was used.   Therapist Generated HPI Evaluation  Problem details: Pt presents with R shoulder pain that has been going on for over a year.  NKI. It will ache with certain movements, even at rest she can notice it. Bothersome with push ups/chest press, lying on the R, certain rotation movements - can \"tweak\" it and then whole shoulder will hurt for a couple days. Goes to the gym 2x/week through [employer], yoga 1x/week. Has to make adjustments for certain things when lifting at OT.     MRI showing tendinosis, no RC or labral tearing, no significant bony abnormality..         Type of problem:  Right shoulder.    This is a chronic condition.  Condition occurred with:  Unknown cause.  Where condition occurred: for unknown reasons.  Patient reports pain:  Posterior.  Pain is described as aching (sore) and is intermittent.  Radiates to: shoulder blade. Pain timing: not time dependent.  Since onset symptoms are unchanged.  Associated symptoms:  Loss of strength. Symptoms are exacerbated by lying on extremity, using arm overhead, certain positions and lifting (pushing)  Relieved by: doesn't take medication for it.  Special tests included:  MRI.  Previous treatment " includes physical therapy (2-3 visits). There was none improvement following previous treatment.  Restrictions due to condition include:  Working in normal job without restrictions.  Barriers include:  None as reported by patient.    Patient Health History  Sadie Bishop being seen for Chronic shoulder pain/ache.     Problem began: 7/15/2021.   Problem occurred: Not sure   Pain is reported as 1/10 and 2/10 on pain scale.  General health as reported by patient is good.  Pertinent medical history includes: anemia and pain at night/rest.     Medical allergies: none.   Surgeries include:  None.        Current occupation is health care consultant.   Primary job tasks include:  Computer work and prolonged sitting.                                    Objective:  Standing Alignment:      Shoulder/upper extremity deviations alignment: prominent medial border and inferior angle R>L.                                       Shoulder Evaluation:  ROM:  AROM:  : IR behind the back w/ scap winging.  Flexion:  Left:  180    Right:  180    Abduction:  Left: 180   Right:  180    Internal Rotation:  Left:  T3    Right:  T3  External Rotation:  Left:  80    Right:  80                  Pain: slight discomfort end ranges    Strength:  : mid trap L 4+/5, R 4/5 w/ pain; low trap 3+/5 B.  Flexion: Left:5/5   Pain:    Right: 5/5     Pain:     Abduction:  Left: 5/5  Pain:    Right: 5/5     Pain:    Internal Rotation:  Left:5/5     Pain:    Right: 5/5     Pain:  External Rotation:   Left:5-/5     Pain:   Right:5-/5     Pain:        Elbow Flexion:  Left:5/5     Pain:    Right:5/5     Pain:  Elbow Extension:  Left:5/5     Pain:    Right:5/5     Pain:  Stability Testing:  Stability testing shoulder: Negative sulcus sign FELICIA.      Special Tests:  Special tests assessed shoulder: Pos HK on R w/ increased Horizontal adduction.    Right shoulder positive for the following special tests:Labral (Decreased pain ER vs. IR in [facility])  Right shoulder  negative for the following special tests:Rotator cuff tear and Acrimioclavicular  Palpation:  normal      Mobility Tests:  Mobility wnl shoulder: increased GH anterior translation R>L.                                                  General     ROS    Assessment/Plan:    Patient is a 32 year old female with right side shoulder complaints.    Patient has the following significant findings with corresponding treatment plan.                Diagnosis 1:  R shoulder pain  Pain -  manual therapy, splint/taping/bracing/orthotics, self management, education and home program  Decreased strength - therapeutic exercise, therapeutic activities and home program  Decreased function - therapeutic activities and home program  Impaired posture - neuro re-education, therapeutic activities and home program  Instability -  Therapeutic Activity  Therapeutic Exercise  Neuromuscular Re-education  Splinting/Taping/Bracing/Orthotic  home program    Therapy Evaluation Codes:   Cumulative Therapy Evaluation is: Low complexity.    Previous and current functional limitations:  (See Goal Flow Sheet for this information)    Short term and Long term goals: (See Goal Flow Sheet for this information)     Communication ability:  Patient appears to be able to clearly communicate and understand verbal and written communication and follow directions correctly.  Treatment Explanation - The following has been discussed with the patient:   RX ordered/plan of care  Anticipated outcomes  Possible risks and side effects  This patient would benefit from PT intervention to resume normal activities.   Rehab potential is excellent.    Frequency:  1 X week, once daily  Duration:  for 8 weeks  Discharge Plan:  Achieve all LTG.  Independent in home treatment program.  Reach maximal therapeutic benefit.    Please refer to the daily flowsheet for treatment today, total treatment time and time spent performing 1:1 timed codes.

## 2022-11-28 ENCOUNTER — THERAPY VISIT (OUTPATIENT)
Dept: PHYSICAL THERAPY | Facility: CLINIC | Age: 32
End: 2022-11-28
Payer: COMMERCIAL

## 2022-11-28 DIAGNOSIS — G89.29 CHRONIC RIGHT SHOULDER PAIN: Primary | ICD-10-CM

## 2022-11-28 DIAGNOSIS — M25.511 CHRONIC RIGHT SHOULDER PAIN: Primary | ICD-10-CM

## 2022-11-28 PROCEDURE — 97110 THERAPEUTIC EXERCISES: CPT | Mod: GP

## 2022-12-12 ENCOUNTER — THERAPY VISIT (OUTPATIENT)
Dept: PHYSICAL THERAPY | Facility: CLINIC | Age: 32
End: 2022-12-12
Payer: COMMERCIAL

## 2022-12-12 DIAGNOSIS — G89.29 CHRONIC RIGHT SHOULDER PAIN: Primary | ICD-10-CM

## 2022-12-12 DIAGNOSIS — M25.511 CHRONIC RIGHT SHOULDER PAIN: Primary | ICD-10-CM

## 2022-12-12 PROCEDURE — 97110 THERAPEUTIC EXERCISES: CPT | Mod: GP

## 2022-12-12 PROCEDURE — 97140 MANUAL THERAPY 1/> REGIONS: CPT | Mod: GP

## 2023-01-03 ENCOUNTER — MYC MEDICAL ADVICE (OUTPATIENT)
Dept: FAMILY MEDICINE | Facility: CLINIC | Age: 33
End: 2023-01-03

## 2023-01-04 NOTE — TELEPHONE ENCOUNTER
Recommend ortho evaluation. Message sent to patient via [facility].     GURVINDER Larios CNP on 1/4/2023 at 10:11 AM

## 2023-01-05 NOTE — TELEPHONE ENCOUNTER
DIAGNOSIS: rt shoulder pain/ images at [facility] 11/2022 / self ref/ [facility]   APPOINTMENT DATE: 1.9.23   NOTES STATUS DETAILS   OFFICE NOTE from referring provider Internal 10.20.22 Nuria Escudero APRN CNP     OFFICE NOTE from other specialist Internal [facility]:  6.16.21 Tamia Tabares   MEDICATION LIST Internal    MRI PACS [facility]:  11.4.22 R shoulder     Action January 5, 2023 2:48 PM    Action Taken Faxed request records & mri images to [facility]. Fax [phone]     1.6.23 8:46 AM --Received images from [facility] and resolved in pacs.

## 2023-01-09 ENCOUNTER — OFFICE VISIT (OUTPATIENT)
Dept: ORTHOPEDICS | Facility: CLINIC | Age: 33
End: 2023-01-09
Payer: COMMERCIAL

## 2023-01-09 ENCOUNTER — PRE VISIT (OUTPATIENT)
Dept: ORTHOPEDICS | Facility: CLINIC | Age: 33
End: 2023-01-09

## 2023-01-09 DIAGNOSIS — M75.101 ROTATOR CUFF SYNDROME OF RIGHT SHOULDER: Primary | ICD-10-CM

## 2023-01-09 PROCEDURE — 99203 OFFICE O/P NEW LOW 30 MIN: CPT | Performed by: FAMILY MEDICINE

## 2023-01-09 NOTE — LETTER
"  1/9/2023      RE: Sadie Bishop  [address]  [place] [address]     Dear Colleague,    Thank you for referring your patient, Sadie Bishop, to the [facility] [place]. Please see a copy of my visit note below.    Sports Medicine Clinic Visit    PCP: Nuria Escudero    Sadie Bishop is a 32 year old female who is seen  as self referral presenting with right shoulder pain    Patient has noted intermittent impingement discomfort with the right shoulder with things like reaching behind, certain positions of reaching forward.  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  Its not always predictable and tends to be \"random\".  Patient without radicular discomfort into the right arm, without radicular numbness and tingling.    Patient is a seated job at a [employer]    Injury: No injury    Location of Pain: right shoulder  Duration of Pain: 1 year(s)  Rating of Pain: 5/10  Pain is better with: Nothing  Pain is worse with: Chest press, push ups,  reaching forward  Additional Features: pain  Treatment so far consists of: PT  Prior History of related problems: Previous incidences of the shoulder \"Popping\" out in high school during volleyball    There were no vitals taken for this visit.        Patient was referred to physical therapy 6/16/2021 by her primary provider at [facility] for history of right shoulder \"popping out\" with instability sensation and discomfort with reaching up and repeating behind.  Clinic note 6/16/2021 reviewed by me.    MRI right shoulder without contrast [facility], 11/4/2022 showed moderate supraspinatus tendinosis without rotator cuff tear, normal-appearing biceps tendon no osteochondral abnormality or glenoid abnormality, no evidence of labral tear        PMH:  Surgical History    Surgical History  Surgery Date Site/Laterality Comments   WISDOM TEETH EXTRACTION    "       Active problem list:  Patient Active Problem List   Diagnosis     Shoulder instability, right     Right shoulder pain       FH:  Family History   Problem Relation Age of Onset     Arthritis Mother      Pancreatic Cancer Father      Cancer Birth Father        Depression Birth Father        High Cholesterol Birth Mother          Family History  Relation Name Status Comments   Birth Father         Birth Mother    Alive     Brother    Alive     Maternal Grandfather         Maternal Grandmother         Paternal Grandfather         Paternal Grandmother              SH:  Social History     Socioeconomic History     Marital status: Single     Spouse name: Not on file     Number of children: Not on file     Years of education: Not on file     Highest education level: Not on file   Occupational History     Not on file   Tobacco Use     Smoking status: Never     Smokeless tobacco: Never   Vaping Use     Vaping Use: Never used   Substance and Sexual Activity     Alcohol use: Yes     Comment: ocassionally     Drug use: No     Sexual activity: Yes     Partners: Male     Birth control/protection: Pill   Other Topics Concern     Parent/sibling w/ CABG, MI or angioplasty before 65F 55M? Not Asked   Social History Narrative     Not on file     Social Determinants of Health     Financial Resource Strain: Not on file   Food Insecurity: Not on file   Transportation Needs: Not on file   Physical Activity: Not on file   Stress: Not on file   Social Connections: Not on file   Intimate Partner Violence: Not on file   Housing Stability: Not on file       MEDS:  See EMR, reviewed  ALL:  See EMR, reviewed    REVIEW OF SYSTEMS:  CONSTITUTIONAL:NEGATIVE for fever, chills, change in weight  INTEGUMENTARY/SKIN: NEGATIVE for worrisome rashes, moles or lesions  EYES: NEGATIVE for vision changes or irritation  ENT/MOUTH: NEGATIVE for ear, mouth and throat problems  RESP:NEGATIVE for significant cough or  SOB  BREAST: NEGATIVE for masses, tenderness or discharge  CV: NEGATIVE for chest pain, palpitations or peripheral edema  GI: NEGATIVE for nausea, abdominal pain, heartburn, or change in bowel habits  :NEGATIVE for frequency, dysuria, or hematuria  :NEGATIVE for frequency, dysuria, or hematuria  NEURO: NEGATIVE for weakness, dizziness or paresthesias  ENDOCRINE: NEGATIVE for temperature intolerance, skin/hair changes  HEME/ALLERGY/IMMUNE: NEGATIVE for bleeding problems  PSYCHIATRIC: NEGATIVE for changes in mood or affect        Palpation:    Tender:    Nontender:  SC joint, clavicle, AC joint, acromion, anterior rotator cuff, proximal bicep tendon    Range of Motion:        Active:all normal        Passive: all normal    Strength: rotator cuff--deltoid strength full; supraspinatus strength full; infraspinatus strength full; subscapularis strength full    Special tests: Negative Neer's test, Negative Wilder, Negative Cross-Arm adduction, Negative Anterior Apprehension, Negative Posterior Apprehension, Negative Sulcus Sign, Negative Quiros's, Negative supine SLAP lesion signs    Sulcus sign negative.  Load and shift maneuver negative    Scapular symmetry:  Improvements to be made in symmetry of stabilized movement    Head-fwd, shoulder-fwd posture:  Positive tendency     Distal pulses intact.  Sensation intact.  Skin intact.      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  She would like to see physical therapy at [facility] for this training.  We discussed that nonsteroidal anti-inflammatories and cortisone shots can be used to modulate pain but they do not cure her tendinitis issues.  Follow-up if not improved with physical therapy.    Again, thank you for allowing me to  participate in the care of your patient.      Sincerely,    Juancarlos Aguilar MD

## 2023-01-09 NOTE — PROGRESS NOTES
"Sports Medicine Clinic Visit    PCP: Nuria Escuderolatia Bishop is a 32 year old female who is seen  as self referral presenting with right shoulder pain    Patient has noted intermittent impingement discomfort with the right shoulder with things like reaching behind, certain positions of reaching forward.  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  Its not always predictable and tends to be \"random\".  Patient without radicular discomfort into the right arm, without radicular numbness and tingling.    Patient is a seated job at a [employer]    Injury: No injury    Location of Pain: right shoulder  Duration of Pain: 1 year(s)  Rating of Pain: 5/10  Pain is better with: Nothing  Pain is worse with: Chest press, push ups,  reaching forward  Additional Features: pain  Treatment so far consists of: PT  Prior History of related problems: Previous incidences of the shoulder \"Popping\" out in high school during volleyball    There were no vitals taken for this visit.        Patient was referred to physical therapy 6/16/2021 by her primary provider at [facility] for history of right shoulder \"popping out\" with instability sensation and discomfort with reaching up and repeating behind.  Clinic note 6/16/2021 reviewed by me.    MRI right shoulder without contrast CDI, 11/4/2022 showed moderate supraspinatus tendinosis without rotator cuff tear, normal-appearing biceps tendon no osteochondral abnormality or glenoid abnormality, no evidence of labral tear        PMH:  Surgical History    Surgical History  Surgery Date Site/Laterality Comments   WISDOM TEETH EXTRACTION          Active problem list:  Patient Active Problem List   Diagnosis     Shoulder instability, right     Right shoulder pain       FH:  Family History   Problem Relation Age of Onset     Arthritis Mother      Pancreatic Cancer Father      Cancer Birth Father      "   Depression Birth Father        High Cholesterol Birth Mother          Family History  Relation Name Status Comments   Birth Father         Birth Mother    Alive     Brother    Alive     Maternal Grandfather         Maternal Grandmother         Paternal Grandfather         Paternal Grandmother              SH:  Social History     Socioeconomic History     Marital status: Single     Spouse name: Not on file     Number of children: Not on file     Years of education: Not on file     Highest education level: Not on file   Occupational History     Not on file   Tobacco Use     Smoking status: Never     Smokeless tobacco: Never   Vaping Use     Vaping Use: Never used   Substance and Sexual Activity     Alcohol use: Yes     Comment: ocassionally     Drug use: No     Sexual activity: Yes     Partners: Male     Birth control/protection: Pill   Other Topics Concern     Parent/sibling w/ CABG, MI or angioplasty before 65F 55M? Not Asked   Social History Narrative     Not on file     Social Determinants of Health     Financial Resource Strain: Not on file   Food Insecurity: Not on file   Transportation Needs: Not on file   Physical Activity: Not on file   Stress: Not on file   Social Connections: Not on file   Intimate Partner Violence: Not on file   Housing Stability: Not on file       MEDS:  See EMR, reviewed  ALL:  See EMR, reviewed    REVIEW OF SYSTEMS:  CONSTITUTIONAL:NEGATIVE for fever, chills, change in weight  INTEGUMENTARY/SKIN: NEGATIVE for worrisome rashes, moles or lesions  EYES: NEGATIVE for vision changes or irritation  ENT/MOUTH: NEGATIVE for ear, mouth and throat problems  RESP:NEGATIVE for significant cough or SOB  BREAST: NEGATIVE for masses, tenderness or discharge  CV: NEGATIVE for chest pain, palpitations or peripheral edema  GI: NEGATIVE for nausea, abdominal pain, heartburn, or change in bowel habits  :NEGATIVE for frequency, dysuria, or hematuria  :NEGATIVE  for frequency, dysuria, or hematuria  NEURO: NEGATIVE for weakness, dizziness or paresthesias  ENDOCRINE: NEGATIVE for temperature intolerance, skin/hair changes  HEME/ALLERGY/IMMUNE: NEGATIVE for bleeding problems  PSYCHIATRIC: NEGATIVE for changes in mood or affect        Palpation:    Tender:    Nontender:  SC joint, clavicle, AC joint, acromion, anterior rotator cuff, proximal bicep tendon    Range of Motion:        Active:all normal        Passive: all normal    Strength: rotator cuff--deltoid strength full; supraspinatus strength full; infraspinatus strength full; subscapularis strength full    Special tests: Negative Neer's test, Negative Wilder, Negative Cross-Arm adduction, Negative Anterior Apprehension, Negative Posterior Apprehension, Negative Sulcus Sign, Negative Quiros's, Negative supine SLAP lesion signs    Sulcus sign negative.  Load and shift maneuver negative    Scapular symmetry:  Improvements to be made in symmetry of stabilized movement    Head-fwd, shoulder-fwd posture:  Positive tendency     Distal pulses intact.  Sensation intact.  Skin intact.      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  She would like to see physical therapy at [place] for this training.  We discussed that nonsteroidal anti-inflammatories and cortisone shots can be used to modulate pain but they do not cure her tendinitis issues.  Follow-up if not improved with physical therapy.

## 2023-01-16 ENCOUNTER — THERAPY VISIT (OUTPATIENT)
Dept: PHYSICAL THERAPY | Facility: CLINIC | Age: 33
End: 2023-01-16
Payer: COMMERCIAL

## 2023-01-16 DIAGNOSIS — G89.29 CHRONIC RIGHT SHOULDER PAIN: Primary | ICD-10-CM

## 2023-01-16 DIAGNOSIS — M25.511 CHRONIC RIGHT SHOULDER PAIN: Primary | ICD-10-CM

## 2023-01-16 PROCEDURE — 97110 THERAPEUTIC EXERCISES: CPT | Mod: GP

## 2023-01-25 DIAGNOSIS — Z30.41 ENCOUNTER FOR SURVEILLANCE OF CONTRACEPTIVE PILLS: ICD-10-CM

## 2023-01-25 RX ORDER — NORETHINDRONE ACETATE AND ETHINYL ESTRADIOL 1.5-30(21)
1 KIT ORAL DAILY
Qty: 84 TABLET | Refills: 4 | Status: SHIPPED | OUTPATIENT
Start: 2023-01-25 | End: 2024-03-25

## 2023-01-27 ENCOUNTER — THERAPY VISIT (OUTPATIENT)
Dept: PHYSICAL THERAPY | Facility: CLINIC | Age: 33
End: 2023-01-27
Payer: COMMERCIAL

## 2023-01-27 DIAGNOSIS — M25.511 CHRONIC RIGHT SHOULDER PAIN: Primary | ICD-10-CM

## 2023-01-27 DIAGNOSIS — G89.29 CHRONIC RIGHT SHOULDER PAIN: Primary | ICD-10-CM

## 2023-01-27 PROCEDURE — 97140 MANUAL THERAPY 1/> REGIONS: CPT | Mod: GP

## 2023-01-27 PROCEDURE — 97110 THERAPEUTIC EXERCISES: CPT | Mod: GP

## 2023-02-15 ENCOUNTER — THERAPY VISIT (OUTPATIENT)
Dept: PHYSICAL THERAPY | Facility: CLINIC | Age: 33
End: 2023-02-15
Payer: COMMERCIAL

## 2023-02-15 DIAGNOSIS — M25.511 CHRONIC RIGHT SHOULDER PAIN: Primary | ICD-10-CM

## 2023-02-15 DIAGNOSIS — G89.29 CHRONIC RIGHT SHOULDER PAIN: Primary | ICD-10-CM

## 2023-02-15 PROCEDURE — 97140 MANUAL THERAPY 1/> REGIONS: CPT | Mod: GP

## 2023-02-15 PROCEDURE — 97110 THERAPEUTIC EXERCISES: CPT | Mod: GP

## 2023-03-27 NOTE — PROGRESS NOTES
"  Assessment & Plan     Nasal congestion      Multiple etiologies considered.  Will start with allergen mediated by treating with intranasal steroid.    Devited septum considered but she notes equal difficulty in moving air in both nares.     If no improvement will refer to ENT for structural workup.       Leonel Rashid MD  [facility] KENJI Noel is a 32 year old, presenting for the following health issues:  Sinus Problem  No flowsheet data found.  HPI   Pt is new to me    Feels like she can't breathe through her nose.  Sometimes notes it's 'impossible'.  Worse with lying down.    Uses breathe right strips.  Feels trouble moving air from both nares.      She does note near constant runny nose.  No watery eyes or sneezing.      Did get some ear infections as a young child but none since.   Has a cat at home, no obvious allergies.      No trauma or injury to facial structures.      Works from home.        Review of Systems         Objective    /77 (BP Location: Left arm, Patient Position: Chair, Cuff Size: Adult Regular)   Pulse 65   Temp 97.3  F (36.3  C) (Temporal)   Resp 18   Ht 1.765 m (5' 9.5\")   Wt 76.2 kg (168 lb)   LMP 03/21/2023 (Exact Date)   SpO2 99%   Breastfeeding No   BMI 24.45 kg/m    Body mass index is 24.45 kg/m .  Physical Exam   GEN NAD  ENT MMM, TM clear B.    NOSE:  B turbinates mildly inflamed.                      "

## 2023-03-28 ENCOUNTER — OFFICE VISIT (OUTPATIENT)
Dept: FAMILY MEDICINE | Facility: CLINIC | Age: 33
End: 2023-03-28
Payer: COMMERCIAL

## 2023-03-28 VITALS
TEMPERATURE: 97.3 F | DIASTOLIC BLOOD PRESSURE: 77 MMHG | OXYGEN SATURATION: 99 % | HEIGHT: 70 IN | RESPIRATION RATE: 18 BRPM | WEIGHT: 168 LBS | SYSTOLIC BLOOD PRESSURE: 122 MMHG | BODY MASS INDEX: 24.05 KG/M2 | HEART RATE: 65 BPM

## 2023-03-28 DIAGNOSIS — R09.81 NASAL CONGESTION: Primary | ICD-10-CM

## 2023-03-28 PROCEDURE — 99203 OFFICE O/P NEW LOW 30 MIN: CPT | Performed by: INTERNAL MEDICINE

## 2023-03-28 NOTE — PATIENT INSTRUCTIONS
Get FLONASE (AKA FLUTICASONE)    2 sprays each nostril every evening.    IF no improvement in a week message me!  We can put in a referral to ENT

## 2023-04-24 NOTE — PROGRESS NOTES
Discharge Note    Progress reporting period is from initial evaluation date (please see noted date below) to Feb 15, 2023.  Linked Episodes   Type: Episode: Status: Noted: Resolved: Last update: Updated by:   PHYSICAL THERAPY R shoulder 11/16/22 Active 11/16/2022  2/15/2023 12:28 PM Blanca Cooley, JORDAN      Comments:       Sadie failed to follow up and current status is unknown.  Please see information below for last relevant information on current status.  Patient seen for 6 visits.    SUBJECTIVE  Subjective changes noted by patient:  Has been working on chin tucks, thinks she feels a little bit of a less ache in her shoulder blade. Will still feel the ache at times, unsure what causes it. Trying to sleep less on her stomach, but will still wake up on her stomach. Will have still have the lateral shoulder pain still with certain movements, exercises in which she raises her arms out to the side, however thinks it is a little better.  .  Current pain level is  .     Previous pain level was  2/10.   Changes in function:  Yes (See Goal flowsheet attached for changes in current functional level)  Adverse reaction to treatment or activity: None    OBJECTIVE  Changes noted in objective findings: CROM WNL, retraction continues w/ ERP R shoulder blade, however less intense, Same with chin tuck w/ OP.  No worse after. Plan to continue w/ retraction w/ OP. Improved tolerance to counter top push up in triceps push up position, instruction to continue and progress as able. Shoulder blade burning improved after manual therapy. Seated thoracic rotation no pain, seated thoracic SB L mild R pain, to the R no pain.     ASSESSMENT/PLAN  Diagnosis: R shoulder pain   Updated problem list and treatment plan:   Pain - HEP  Decreased function - HEP  Decreased strength - HEP  Impaired posture - HEP  STG/LTGs have been met or progress has been made towards goals:  Yes, please see goal flowsheet for most current information  Assessment  of Progress: current status is unknown.    Last current status:     Self Management Plans:  HEP  I have re-evaluated this patient and find that the nature, scope, duration and intensity of the therapy is appropriate for the medical condition of the patient.  Sadie continues to require the following intervention to meet STG and LTG's:  HEP.    Recommendations:  Discharge with current home program.  Patient to follow up with MD as needed.    Please refer to the daily flowsheet for treatment today, total treatment time and time spent performing 1:1 timed codes.

## 2023-05-26 ENCOUNTER — OFFICE VISIT (OUTPATIENT)
Dept: FAMILY MEDICINE | Facility: CLINIC | Age: 33
End: 2023-05-26

## 2023-05-26 VITALS
DIASTOLIC BLOOD PRESSURE: 64 MMHG | WEIGHT: 166 LBS | HEART RATE: 75 BPM | BODY MASS INDEX: 24.16 KG/M2 | SYSTOLIC BLOOD PRESSURE: 112 MMHG | OXYGEN SATURATION: 99 %

## 2023-05-26 DIAGNOSIS — F41.1 GENERALIZED ANXIETY DISORDER: Primary | ICD-10-CM

## 2023-05-26 PROCEDURE — 99214 OFFICE O/P EST MOD 30 MIN: CPT

## 2023-05-26 RX ORDER — ESCITALOPRAM OXALATE 10 MG/1
TABLET ORAL
Qty: 35 TABLET | Refills: 0 | Status: SHIPPED | OUTPATIENT
Start: 2023-05-26 | End: 2023-06-13

## 2023-05-26 ASSESSMENT — ANXIETY QUESTIONNAIRES
GAD7 TOTAL SCORE: 10
2. NOT BEING ABLE TO STOP OR CONTROL WORRYING: MORE THAN HALF THE DAYS
IF YOU CHECKED OFF ANY PROBLEMS ON THIS QUESTIONNAIRE, HOW DIFFICULT HAVE THESE PROBLEMS MADE IT FOR YOU TO DO YOUR WORK, TAKE CARE OF THINGS AT HOME, OR GET ALONG WITH OTHER PEOPLE: SOMEWHAT DIFFICULT
5. BEING SO RESTLESS THAT IT IS HARD TO SIT STILL: NOT AT ALL
7. FEELING AFRAID AS IF SOMETHING AWFUL MIGHT HAPPEN: SEVERAL DAYS
GAD7 TOTAL SCORE: 10
3. WORRYING TOO MUCH ABOUT DIFFERENT THINGS: MORE THAN HALF THE DAYS
1. FEELING NERVOUS, ANXIOUS, OR ON EDGE: MORE THAN HALF THE DAYS
6. BECOMING EASILY ANNOYED OR IRRITABLE: MORE THAN HALF THE DAYS

## 2023-05-26 ASSESSMENT — PATIENT HEALTH QUESTIONNAIRE - PHQ9
SUM OF ALL RESPONSES TO PHQ QUESTIONS 1-9: 3
5. POOR APPETITE OR OVEREATING: SEVERAL DAYS

## 2023-05-26 NOTE — PROGRESS NOTES
Assessment & Plan     Generalized anxiety disorder  -patient presents today after doing months of weekly therapy with a therapist she and her therapist feel she would benefit from medicinal intervention for NICHOLE  -NICHOLE-7 today was: 10  -will titrate up on lexapro and re-evaluate in 3 weeks. Patient to continue with therapist for maximal benefits of anxiety control. Patient agrees with plan. Side effects discussed. Questions and concerns addressed  - escitalopram (LEXAPRO) 10 MG tablet; Take 1 tablet (10 mg) by mouth daily for 7 days, THEN 2 tablets (20 mg) daily for 14 days.                 Return in about 3 weeks (around 6/16/2023), or if symptoms worsen or fail to improve, for Follow up.    GURVINDER Fernandez [facility]    Livia Noel is a 32 year old, presenting for the following health issues:  Anxiety    HPI   Patient states she has felt anxious - cannot turn off her brain over the past year or so. No triggers. This has started to affect ability to sleep and also during daily activities. 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 Feels safe in relationships.     Abnormal Mood Symptoms  Onset/Duration: few months  Description: anxiety - 'unable to stop my brain from overthinking and turn off my thoughts'  Depression (if yes, do PHQ-9): No  Anxiety (if yes, do NICHOLE-7): YES  Accompanying Signs & Symptoms:  Still participating in activities that you used to enjoy: YES  Fatigue: No  Irritability: No  Difficulty concentrating: YES  Changes in appetite: No  Problems with sleep: YES  Heart racing/beating fast: No  Abnormally elevated, expansive, or irritable mood: No  Persistently increased activity or energy: No  Thoughts of hurting yourself or others: No  History:  Recent stress or major life event: No  Prior depression or anxiety: None  Family history of depression or anxiety: YES - father, unable to recall what medication  he took,  years ago  Alcohol/drug use: No  Difficulty sleeping: YES - difficulty falling asleep or falling back asleep if she wakes up  Precipitating or alleviating factors: None  Therapies tried and outcome: none and individual therapy      2023     4:38 PM   PHQ   PHQ-9 Total Score 3   Q9: Thoughts of better off dead/self-harm past 2 weeks Not at all         2023     4:38 PM   NICHOLE-7 SCORE   Total Score 10           Review of Systems   Constitutional, HEENT, cardiovascular, pulmonary, gi and gu systems are negative, except as otherwise noted.      Objective    /64   Pulse 75   Wt 75.3 kg (166 lb)   LMP 2023 (Approximate)   SpO2 99%   BMI 24.16 kg/m    Body mass index is 24.16 kg/m .  Physical Exam   GENERAL: healthy, alert and no distress  NECK: no adenopathy, no asymmetry, masses, or scars and thyroid normal to palpation  RESP: lungs clear to auscultation - no rales, rhonchi or wheezes  CV: regular rate and rhythm, normal S1 S2, no S3 or S4, no murmur, click or rub, no peripheral edema and peripheral pulses strong  ABDOMEN: soft, nontender, no hepatosplenomegaly, no masses and bowel sounds normal  MS: no gross musculoskeletal defects noted, no edema

## 2023-06-13 ENCOUNTER — MYC REFILL (OUTPATIENT)
Dept: FAMILY MEDICINE | Facility: CLINIC | Age: 33
End: 2023-06-13

## 2023-06-13 DIAGNOSIS — F41.1 GENERALIZED ANXIETY DISORDER: ICD-10-CM

## 2023-06-14 RX ORDER — ESCITALOPRAM OXALATE 20 MG/1
20 TABLET ORAL DAILY
Qty: 30 TABLET | Refills: 0 | Status: SHIPPED | OUTPATIENT
Start: 2023-06-14 | End: 2023-09-18

## 2023-06-14 NOTE — TELEPHONE ENCOUNTER
Medication refill request for Lexapro. Patient has titrated to 20mg daily. Patient is scheduled for follow up office visit 6/21/23. Refill entered for 20mg tab and routed to Monse Nichole CNP for review. Jo Ann Leigh

## 2023-07-06 ENCOUNTER — OFFICE VISIT (OUTPATIENT)
Dept: FAMILY MEDICINE | Facility: CLINIC | Age: 33
End: 2023-07-06

## 2023-07-06 VITALS
OXYGEN SATURATION: 98 % | BODY MASS INDEX: 23.03 KG/M2 | HEART RATE: 71 BPM | DIASTOLIC BLOOD PRESSURE: 82 MMHG | WEIGHT: 158.2 LBS | SYSTOLIC BLOOD PRESSURE: 136 MMHG

## 2023-07-06 DIAGNOSIS — F41.1 GENERALIZED ANXIETY DISORDER: Primary | ICD-10-CM

## 2023-07-06 PROCEDURE — 99213 OFFICE O/P EST LOW 20 MIN: CPT

## 2023-07-06 RX ORDER — ESCITALOPRAM OXALATE 20 MG/1
20 TABLET ORAL DAILY
Qty: 30 TABLET | Refills: 0 | Status: SHIPPED | OUTPATIENT
Start: 2023-07-06 | End: 2023-08-09

## 2023-07-06 ASSESSMENT — ANXIETY QUESTIONNAIRES
2. NOT BEING ABLE TO STOP OR CONTROL WORRYING: SEVERAL DAYS
GAD7 TOTAL SCORE: 5
1. FEELING NERVOUS, ANXIOUS, OR ON EDGE: SEVERAL DAYS
7. FEELING AFRAID AS IF SOMETHING AWFUL MIGHT HAPPEN: SEVERAL DAYS
6. BECOMING EASILY ANNOYED OR IRRITABLE: SEVERAL DAYS
3. WORRYING TOO MUCH ABOUT DIFFERENT THINGS: SEVERAL DAYS
5. BEING SO RESTLESS THAT IT IS HARD TO SIT STILL: NOT AT ALL
GAD7 TOTAL SCORE: 5
IF YOU CHECKED OFF ANY PROBLEMS ON THIS QUESTIONNAIRE, HOW DIFFICULT HAVE THESE PROBLEMS MADE IT FOR YOU TO DO YOUR WORK, TAKE CARE OF THINGS AT HOME, OR GET ALONG WITH OTHER PEOPLE: SOMEWHAT DIFFICULT

## 2023-07-06 ASSESSMENT — PATIENT HEALTH QUESTIONNAIRE - PHQ9
5. POOR APPETITE OR OVEREATING: NOT AT ALL
SUM OF ALL RESPONSES TO PHQ QUESTIONS 1-9: 7

## 2023-07-06 NOTE — PROGRESS NOTES
Assessment & Plan     Generalized anxiety disorder  -given patient is seeing improvements in anxiety but having insomnia we will have her take lexapro in the morning instead of at night  -if not improving we can change to a different mediation - we spoke about sertraline   -patient to follow up with me in 2-3 weeks either in person over mychart or virtual visit to see how she is doing on the medication - we did discuss if she feels she wants to switch medication I would prefer her to have an in person visit for education purposes   -Patient verbalized understanding and is agreeable to the discussed plan of care.        Prescription drug management         See Patient Instructions    Return in about 2 weeks (around 7/20/2023).    GURVINDER Fernandez CNP  [facility]    Livia Noel is a 32 year old, presenting for the following health issues:  Recheck Medication (Lexapro. )    HPI     Patient started on escitalopram about 3 weeks ago, also sees therapist. Feels her anxiety is doing much better, however, has insomnia.  Taking escitalopram before bed          Review of Systems   CONSTITUTIONAL: NEGATIVE for fever, chills, change in weight  ENT/MOUTH: NEGATIVE for ear, mouth and throat problems  RESP: NEGATIVE for significant cough or SOB  CV: NEGATIVE for chest pain, palpitations or peripheral edema      Objective    /82   Pulse 71   Wt 71.8 kg (158 lb 3.2 oz)   LMP 05/16/2023 (Approximate)   SpO2 98%   BMI 23.03 kg/m    Body mass index is 23.03 kg/m .  Physical Exam   GENERAL: healthy, alert and no distress  NECK: no adenopathy, no asymmetry, masses, or scars and thyroid normal to palpation  RESP: lungs clear to auscultation - no rales, rhonchi or wheezes  CV: regular rate and rhythm, normal S1 S2, no S3 or S4, no murmur, click or rub, no peripheral edema and peripheral pulses strong  ABDOMEN: soft, nontender, no hepatosplenomegaly, no masses and bowel sounds normal  MS: no gross  musculoskeletal defects noted, no edema

## 2023-08-09 ENCOUNTER — MYC REFILL (OUTPATIENT)
Dept: FAMILY MEDICINE | Facility: CLINIC | Age: 33
End: 2023-08-09

## 2023-08-09 DIAGNOSIS — F41.1 GENERALIZED ANXIETY DISORDER: ICD-10-CM

## 2023-08-09 NOTE — TELEPHONE ENCOUNTER
See [employer] message. Patient requesting refill on escitalopram 20mg QD.   Last related visit: 7/6/23 with Monse Nichole NP    Assessment and plan from visit:   Generalized anxiety disorder  -given patient is seeing improvements in anxiety but having insomnia we will have her take lexapro in the morning instead of at night  -if not improving we can change to a different mediation - we spoke about sertraline   -patient to follow up with me in 2-3 weeks either in person over [employer] or virtual visit to see how she is doing on the medication - we did discuss if she feels she wants to switch medication I would prefer her to have an in person visit for education purposes       Per patient's [employer] note today. She did switch to taking med in morning and is sleeping better so would like to stay on current dose.   Plan: routed to Monse Nichole NP for review.  Ashlie Barbour RN

## 2023-08-10 RX ORDER — ESCITALOPRAM OXALATE 20 MG/1
20 TABLET ORAL DAILY
Qty: 90 TABLET | Refills: 0 | Status: SHIPPED | OUTPATIENT
Start: 2023-08-10 | End: 2023-10-23 | Stop reason: ALTCHOICE

## 2023-09-18 ENCOUNTER — VIRTUAL VISIT (OUTPATIENT)
Dept: FAMILY MEDICINE | Facility: CLINIC | Age: 33
End: 2023-09-18

## 2023-09-18 DIAGNOSIS — F41.1 GENERALIZED ANXIETY DISORDER: Primary | ICD-10-CM

## 2023-09-18 PROCEDURE — 99213 OFFICE O/P EST LOW 20 MIN: CPT | Mod: 95

## 2023-09-18 NOTE — PROGRESS NOTES
Sadie is a 33 year old who is being evaluated via a billable video visit.      How would you like to obtain your AVS? MyChart  If the video visit is dropped, the invitation should be resent by: Text to cell phone: [phone]  Will anyone else be joining your video visit? No          Assessment & Plan     Generalized anxiety disorder  -will switch from escitalopram to fluoxetine today given insomnia and fatigue - also had issues with nausea in the first week  -patient may transition from escitalopram to fluoxetine without taper  -follow up in 1 month  - FLUoxetine (PROZAC) 20 MG capsule  Dispense: 30 capsule; Refill: 0      Prescription drug management         See Patient Instructions    No follow-ups on file.    GURVINDER Fernandez [facility]    Subjective   Sadie is a 33 year old, presenting for the following health issues:  Medication Problem (Lexapro - since started trouble sleeping / getting a good night sleep . Started taking in AM after last visit but very fatigued throughout the day and still some issues with sleeping)    HPI     Anxiety Follow-Up  How are you doing with your anxiety since your last visit? Improved but having insomnia, fatigue, and is affecting daily life - has been on lexapro since May without improvements  Are you having other symptoms that might be associated with anxiety? No  Have you had a significant life event? No   Are you feeling depressed? No  Do you have any concerns with your use of alcohol or other drugs? No    Social History     Tobacco Use    Smoking status: Never    Smokeless tobacco: Never   Vaping Use    Vaping Use: Never used   Substance Use Topics    Alcohol use: Yes     Comment: ocassionally    Drug use: No         5/26/2023     4:38 PM 7/6/2023     1:12 PM   NICHOLE-7 SCORE   Total Score 10 5         5/26/2023     4:38 PM 7/6/2023     1:12 PM   PHQ   PHQ-9 Total Score 3 7   Q9: Thoughts of better off dead/self-harm past 2 weeks Not at all Not at  all         7/6/2023     1:12 PM   Last PHQ-9   1.  Little interest or pleasure in doing things 0   2.  Feeling down, depressed, or hopeless 0   3.  Trouble falling or staying asleep, or sleeping too much 3   4.  Feeling tired or having little energy 3   5.  Poor appetite or overeating 0   6.  Feeling bad about yourself 0   7.  Trouble concentrating 1   8.  Moving slowly or restless 0   Q9: Thoughts of better off dead/self-harm past 2 weeks 0   PHQ-9 Total Score 7   Difficulty at work, home, or with people Somewhat difficult         7/6/2023     1:12 PM   NICHOLE-7    1. Feeling nervous, anxious, or on edge 1   2. Not being able to stop or control worrying 1   3. Worrying too much about different things 1   4. Trouble relaxing 0   5. Being so restless that it is hard to sit still 0   6. Becoming easily annoyed or irritable 1   7. Feeling afraid, as if something awful might happen 1   NICHOLE-7 Total Score 5   If you checked any problems, how difficult have they made it for you to do your work, take care of things at home, or get along with other people? Somewhat difficult             Review of Systems   Constitutional, HEENT, cardiovascular, pulmonary, gi and gu systems are negative, except as otherwise noted.      Objective           Vitals:  No vitals were obtained today due to virtual visit.    Physical Exam   GENERAL: Healthy, alert and no distress  EYES: Eyes grossly normal to inspection.  No discharge or erythema, or obvious scleral/conjunctival abnormalities.  RESP: No audible wheeze, cough, or visible cyanosis.  No visible retractions or increased work of breathing.    SKIN: Visible skin clear. No significant rash, abnormal pigmentation or lesions.  NEURO: Cranial nerves grossly intact.  Mentation and speech appropriate for age.  PSYCH: Mentation appears normal, affect normal/bright, judgement and insight intact, normal speech and appearance well-groomed.                Video-Visit Details    Type of service:  Video  Visit     Originating Location (pt. Location): Home    Distant Location (provider location):  On-site  Platform used for Video Visit: Jacquelin       5

## 2023-10-23 ENCOUNTER — VIRTUAL VISIT (OUTPATIENT)
Dept: FAMILY MEDICINE | Facility: CLINIC | Age: 33
End: 2023-10-23

## 2023-10-23 DIAGNOSIS — F41.1 GENERALIZED ANXIETY DISORDER: ICD-10-CM

## 2023-10-23 PROCEDURE — 99443 PR PHYSICIAN TELEPHONE EVALUATION 21-30 MIN: CPT

## 2023-10-23 RX ORDER — FLUOXETINE 40 MG/1
40 CAPSULE ORAL DAILY
Qty: 90 CAPSULE | Refills: 0 | Status: SHIPPED | OUTPATIENT
Start: 2023-10-23 | End: 2024-01-18

## 2023-10-23 ASSESSMENT — ANXIETY QUESTIONNAIRES
GAD7 TOTAL SCORE: 5
4. TROUBLE RELAXING: SEVERAL DAYS
6. BECOMING EASILY ANNOYED OR IRRITABLE: SEVERAL DAYS
2. NOT BEING ABLE TO STOP OR CONTROL WORRYING: SEVERAL DAYS
7. FEELING AFRAID AS IF SOMETHING AWFUL MIGHT HAPPEN: NOT AT ALL
GAD7 TOTAL SCORE: 5
3. WORRYING TOO MUCH ABOUT DIFFERENT THINGS: SEVERAL DAYS
IF YOU CHECKED OFF ANY PROBLEMS ON THIS QUESTIONNAIRE, HOW DIFFICULT HAVE THESE PROBLEMS MADE IT FOR YOU TO DO YOUR WORK, TAKE CARE OF THINGS AT HOME, OR GET ALONG WITH OTHER PEOPLE: SOMEWHAT DIFFICULT
1. FEELING NERVOUS, ANXIOUS, OR ON EDGE: SEVERAL DAYS
5. BEING SO RESTLESS THAT IT IS HARD TO SIT STILL: NOT AT ALL

## 2023-10-23 ASSESSMENT — PATIENT HEALTH QUESTIONNAIRE - PHQ9
SUM OF ALL RESPONSES TO PHQ QUESTIONS 1-9: 2
10. IF YOU CHECKED OFF ANY PROBLEMS, HOW DIFFICULT HAVE THESE PROBLEMS MADE IT FOR YOU TO DO YOUR WORK, TAKE CARE OF THINGS AT HOME, OR GET ALONG WITH OTHER PEOPLE: NOT DIFFICULT AT ALL
SUM OF ALL RESPONSES TO PHQ QUESTIONS 1-9: 2

## 2023-10-23 NOTE — PROGRESS NOTES
Sadie is a 33 year old who is being evaluated via a billable video visit.      How would you like to obtain your AVS? MyChart  If the video visit is dropped, the invitation should be resent by: Text to cell phone: [phone]  Will anyone else be joining your video visit? No          Assessment & Plan     Generalized anxiety disorder  -discussed use of buspar to help with anxiety while trying to address fluoxetine dosage, patient declines and would prefer to increase dose of fluoxetine, feel this is appropriate with follow up in 1 month  -Education about adverse effects of prescription medication discussed  -Patient verbalized understanding and is agreeable to the discussed plan of care.    - FLUoxetine (PROZAC) 40 MG capsule  Dispense: 90 capsule; Refill: 0      Prescription drug management         MEDICATIONS:        - Increase fluoxetine to 40mg daily     Return in about 1 month (around 11/23/2023) for Follow up.    GURVINDER Fernandez [facility]    Subjective   Sadie is a 33 year old, presenting for the following health issues:  Recheck Medication    HPI     Anxiety Follow-Up  How are you doing with your anxiety since your last visit? Sleep has improved, anxiety has worsened  Are you having other symptoms that might be associated with anxiety? No  Have you had a significant life event? No   Are you feeling depressed? No  Do you have any concerns with your use of alcohol or other drugs? No    Social History     Tobacco Use    Smoking status: Never    Smokeless tobacco: Never   Vaping Use    Vaping Use: Never used   Substance Use Topics    Alcohol use: Yes     Comment: ocassionally    Drug use: No         5/26/2023     4:38 PM 7/6/2023     1:12 PM 10/23/2023    12:55 PM   NICHOLE-7 SCORE   Total Score   5 (mild anxiety)   Total Score 10 5 5         5/26/2023     4:38 PM 7/6/2023     1:12 PM 10/23/2023    12:56 PM   PHQ   PHQ-9 Total Score 3 7 2   Q9: Thoughts of better off dead/self-harm past  2 weeks Not at all Not at all Not at all         10/23/2023    12:56 PM   Last PHQ-9   1.  Little interest or pleasure in doing things 1   2.  Feeling down, depressed, or hopeless 0   3.  Trouble falling or staying asleep, or sleeping too much 0   4.  Feeling tired or having little energy 1   5.  Poor appetite or overeating 0   6.  Feeling bad about yourself 0   7.  Trouble concentrating 0   8.  Moving slowly or restless 0   Q9: Thoughts of better off dead/self-harm past 2 weeks 0   PHQ-9 Total Score 2         10/23/2023    12:55 PM   NICHOLE-7    1. Feeling nervous, anxious, or on edge 1   2. Not being able to stop or control worrying 1   3. Worrying too much about different things 1   4. Trouble relaxing 1   5. Being so restless that it is hard to sit still 0   6. Becoming easily annoyed or irritable 1   7. Feeling afraid, as if something awful might happen 0   NICHOLE-7 Total Score 5   If you checked any problems, how difficult have they made it for you to do your work, take care of things at home, or get along with other people? Somewhat difficult             Review of Systems   Constitutional, HEENT, cardiovascular, pulmonary, gi and gu systems are negative, except as otherwise noted.      Objective        Feeling less exhausted and sleeping better since off of lexapro.   No side effects on fluoxetine but experiencing mixed anxiety generalized anxiety and situational anxiety  - has more ruminating thoughts compared to escitalopram - catastrophic thoughts - hard to get out of racing thoughts when present. Able to fall asleep at night - not interfering on sleep process at all.         Vitals:  No vitals were obtained today due to virtual visit.    Physical Exam   GENERAL: Healthy, alert and no distress  EYES: Eyes grossly normal to inspection.  No discharge or erythema, or obvious scleral/conjunctival abnormalities.  RESP: No audible wheeze, cough, or visible cyanosis.  No visible retractions or increased work of  breathing.    SKIN: Visible skin clear. No significant rash, abnormal pigmentation or lesions.  NEURO: Cranial nerves grossly intact.  Mentation and speech appropriate for age.  PSYCH: Mentation appears normal, affect normal/bright, judgement and insight intact, normal speech and appearance well-groomed.                Video-Visit Details    Type of service:  Video Visit     Originating Location (pt. Location): Home    Distant Location (provider location):  On-site  Platform used for Video Visit: [employer]          Answers submitted by the patient for this visit:  Patient Health Questionnaire (Submitted on 10/23/2023)  If you checked off any problems, how difficult have these problems made it for you to do your work, take care of things at home, or get along with other people?: Not difficult at all  PHQ9 TOTAL SCORE: 2  NICHOLE-7 (Submitted on 10/23/2023)  NICHOLE 7 TOTAL SCORE: 5

## 2024-01-18 DIAGNOSIS — F41.1 GENERALIZED ANXIETY DISORDER: ICD-10-CM

## 2024-01-18 NOTE — TELEPHONE ENCOUNTER
Med: Fluoxetine        LOV (related): 10/23/23      Due for F/U around: 1 month     Next Appt: None               5/26/2023     4:38 PM 7/6/2023     1:12 PM 10/23/2023    12:56 PM   PHQ   PHQ-9 Total Score 3 7 2   Q9: Thoughts of better off dead/self-harm past 2 weeks Not at all Not at all Not at all           5/26/2023     4:38 PM 7/6/2023     1:12 PM 10/23/2023    12:55 PM   NICHOLE-7 SCORE   Total Score   5 (mild anxiety)   Total Score 10 5 5

## 2024-01-20 RX ORDER — FLUOXETINE 40 MG/1
40 CAPSULE ORAL DAILY
Qty: 90 CAPSULE | Refills: 0 | Status: SHIPPED | OUTPATIENT
Start: 2024-01-20

## 2024-03-01 ENCOUNTER — OFFICE VISIT (OUTPATIENT)
Dept: FAMILY MEDICINE | Facility: CLINIC | Age: 34
End: 2024-03-01

## 2024-03-01 VITALS
DIASTOLIC BLOOD PRESSURE: 58 MMHG | HEIGHT: 70 IN | BODY MASS INDEX: 25.05 KG/M2 | SYSTOLIC BLOOD PRESSURE: 117 MMHG | OXYGEN SATURATION: 99 % | HEART RATE: 72 BPM | WEIGHT: 175 LBS

## 2024-03-01 DIAGNOSIS — R53.83 OTHER FATIGUE: ICD-10-CM

## 2024-03-01 DIAGNOSIS — F41.1 GENERALIZED ANXIETY DISORDER: Primary | ICD-10-CM

## 2024-03-01 LAB
% GRANULOCYTES: 60.7 % (ref 42.2–75.2)
HCT VFR BLD AUTO: 35.4 % (ref 35–46)
HEMOGLOBIN: 12 G/DL (ref 11.8–15.5)
LYMPHOCYTES NFR BLD AUTO: 31.3 % (ref 20.5–51.1)
MCH RBC QN AUTO: 29.1 PG (ref 27–31)
MCHC RBC AUTO-ENTMCNC: 34.1 G/DL (ref 33–37)
MCV RBC AUTO: 85.3 FL (ref 80–100)
MONOCYTES NFR BLD AUTO: 8 % (ref 1.7–9.3)
PLATELET # BLD AUTO: 358 K/UL (ref 140–450)
RBC # BLD AUTO: 4.15 X10/CMM (ref 3.7–5.2)
WBC # BLD AUTO: 7.1 X10/CMM (ref 3.8–11)

## 2024-03-01 PROCEDURE — 82728 ASSAY OF FERRITIN: CPT

## 2024-03-01 PROCEDURE — 85025 COMPLETE CBC W/AUTO DIFF WBC: CPT

## 2024-03-01 PROCEDURE — 36415 COLL VENOUS BLD VENIPUNCTURE: CPT

## 2024-03-01 PROCEDURE — 82306 VITAMIN D 25 HYDROXY: CPT

## 2024-03-01 PROCEDURE — 99214 OFFICE O/P EST MOD 30 MIN: CPT

## 2024-03-01 RX ORDER — DESVENLAFAXINE 50 MG/1
50 TABLET, FILM COATED, EXTENDED RELEASE ORAL DAILY
Qty: 30 TABLET | Refills: 0 | Status: SHIPPED | OUTPATIENT
Start: 2024-03-01 | End: 2024-03-20

## 2024-03-01 ASSESSMENT — ANXIETY QUESTIONNAIRES
GAD7 TOTAL SCORE: 5
5. BEING SO RESTLESS THAT IT IS HARD TO SIT STILL: NOT AT ALL
3. WORRYING TOO MUCH ABOUT DIFFERENT THINGS: SEVERAL DAYS
6. BECOMING EASILY ANNOYED OR IRRITABLE: MORE THAN HALF THE DAYS
2. NOT BEING ABLE TO STOP OR CONTROL WORRYING: SEVERAL DAYS
7. FEELING AFRAID AS IF SOMETHING AWFUL MIGHT HAPPEN: NOT AT ALL
IF YOU CHECKED OFF ANY PROBLEMS ON THIS QUESTIONNAIRE, HOW DIFFICULT HAVE THESE PROBLEMS MADE IT FOR YOU TO DO YOUR WORK, TAKE CARE OF THINGS AT HOME, OR GET ALONG WITH OTHER PEOPLE: SOMEWHAT DIFFICULT
GAD7 TOTAL SCORE: 5
1. FEELING NERVOUS, ANXIOUS, OR ON EDGE: SEVERAL DAYS

## 2024-03-01 ASSESSMENT — PATIENT HEALTH QUESTIONNAIRE - PHQ9
5. POOR APPETITE OR OVEREATING: NOT AT ALL
SUM OF ALL RESPONSES TO PHQ QUESTIONS 1-9: 3

## 2024-03-01 NOTE — PROGRESS NOTES
"SUBJECTIVE:    Sadie Bishop, is a 33 year old female presenting for the below:     1. Anxiety    Patient has been on fluoxetine and escitalopram, both of which made her fatigued. She discontinued prozac about 1 month ago. Feels the anxiety is worsening, would like to discuss alternatives.         OBJECTIVE:  Vitals:    03/01/24 1535   BP: 117/58   Pulse: 72   SpO2: 99%   Weight: 79.4 kg (175 lb)   Height: 1.765 m (5' 9.5\")    Body mass index is 25.47 kg/m .  General: no acute distress, cooperative with exam.  Eyes: no injection or drainage.  Ears: TM's and canals show no erythema, discharge, or effusion bilaterally.  Throat: moist mucous membranes, no tonsillar exudate or hypertrophy, posterior oral pharynx non-erythematous without lesions.  Neck: supple, no thyroid nodules or enlargement.  Lungs: clear to auscultation bilaterally, normal respiratory effort.  Heart: regular rate, normal S1 and S2, no murmurs.   Extremities: warm, perfused, no swelling or edema.  Neuro: grossly intact   Psych: mental status normal, mood and affect appropriate.      ASSESSMENT / PLAN:      Sadie was seen today for anxiety.    Diagnoses and all orders for this visit:    Generalized anxiety disorder  -     desvenlafaxine (PRISTIQ) 50 MG 24 hr tablet; Take 1 tablet (50 mg) by mouth daily for 30 days  - Will trial SNRI with follow up in 2-3 weeks this can be done virtually if patient would like   - Education about adverse effects of prescription medication discussed  - Red flags that warrant emergent evaluation discussed  - Patient verbalized understanding and is agreeable to the discussed plan of care.    -     VENOUS COLLECTION    Other fatigue  -     Vitamin D Deficiency; Future  -     Iron & Iron Binding Capacity; Future  -     Ferritin; Future  -     CBC with Diff/Plt ([facility])  -     VENOUS COLLECTION  - This could be from the selective serotonin reuptake inhibitor medication as it is improving a little but will check lab work to " be sure no other causes

## 2024-03-02 LAB
FERRITIN SERPL-MCNC: 11 NG/ML (ref 6–175)
VIT D+METAB SERPL-MCNC: 31 NG/ML (ref 20–50)

## 2024-03-04 NOTE — NURSING NOTE
Sophie from  lab called - Iron/Iron studies blood test was canceled due to hemolyzed specimen.  Informed provider  Lyly Martinez MA March 4, 2024 8:37 AM

## 2024-03-20 ENCOUNTER — MYC REFILL (OUTPATIENT)
Dept: FAMILY MEDICINE | Facility: CLINIC | Age: 34
End: 2024-03-20

## 2024-03-20 DIAGNOSIS — F41.1 GENERALIZED ANXIETY DISORDER: ICD-10-CM

## 2024-03-20 NOTE — TELEPHONE ENCOUNTER
Patient sent [employer] message reporting desvenlafaxine 50mg QD is working well for her and would like refilled to continue.     Last related visit: 3/1/24 with Monse Nichole NP for anxiety. Desvenlafaxine ordered with plan for patient to follow up in 2-3 weeks.   Generalized anxiety disorder  -     desvenlafaxine (PRISTIQ) 50 MG 24 hr tablet; Take 1 tablet (50 mg) by mouth daily for 30 days  -     Will trial SNRI with follow up in 2-3 weeks this can be done virtually if patient would like         7/6/2023     1:12 PM 10/23/2023    12:55 PM 3/1/2024     4:25 PM   NICHOLE-7 SCORE   Total Score  5 (mild anxiety)    Total Score 5 5 5         7/6/2023     1:12 PM 10/23/2023    12:56 PM 3/1/2024     4:25 PM   PHQ   PHQ-9 Total Score 7 2 3   Q9: Thoughts of better off dead/self-harm past 2 weeks Not at all Not at all Not at all      Plan: routed to Monse Nichole NP for review  Ashlie Barbour RN

## 2024-03-22 RX ORDER — DESVENLAFAXINE 50 MG/1
50 TABLET, FILM COATED, EXTENDED RELEASE ORAL DAILY
Qty: 90 TABLET | Refills: 0 | Status: SHIPPED | OUTPATIENT
Start: 2024-03-22 | End: 2024-03-26

## 2024-03-25 ENCOUNTER — MYC REFILL (OUTPATIENT)
Dept: FAMILY MEDICINE | Facility: CLINIC | Age: 34
End: 2024-03-25

## 2024-03-25 DIAGNOSIS — Z30.41 ENCOUNTER FOR SURVEILLANCE OF CONTRACEPTIVE PILLS: ICD-10-CM

## 2024-03-27 RX ORDER — NORETHINDRONE ACETATE AND ETHINYL ESTRADIOL 1.5-30(21)
1 KIT ORAL DAILY
Qty: 84 TABLET | Refills: 4 | Status: SHIPPED | OUTPATIENT
Start: 2024-03-27

## 2024-04-28 ENCOUNTER — HEALTH MAINTENANCE LETTER (OUTPATIENT)
Age: 34
End: 2024-04-28

## 2024-07-04 ENCOUNTER — OFFICE VISIT (OUTPATIENT)
Dept: URGENT CARE | Facility: URGENT CARE | Age: 34
End: 2024-07-04
Payer: COMMERCIAL

## 2024-07-04 ENCOUNTER — ANCILLARY PROCEDURE (OUTPATIENT)
Dept: GENERAL RADIOLOGY | Facility: CLINIC | Age: 34
End: 2024-07-04
Attending: INTERNAL MEDICINE
Payer: COMMERCIAL

## 2024-07-04 VITALS
DIASTOLIC BLOOD PRESSURE: 72 MMHG | BODY MASS INDEX: 24.19 KG/M2 | RESPIRATION RATE: 17 BRPM | OXYGEN SATURATION: 98 % | HEART RATE: 91 BPM | WEIGHT: 166.2 LBS | TEMPERATURE: 98.6 F | SYSTOLIC BLOOD PRESSURE: 112 MMHG
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
## 2024-07-04 DIAGNOSIS — S99.911A ANKLE INJURY, RIGHT, INITIAL ENCOUNTER: ICD-10-CM

## 2024-07-04 DIAGNOSIS — S93.491A SPRAIN OF OTHER LIGAMENT OF RIGHT ANKLE, INITIAL ENCOUNTER: Primary | ICD-10-CM

## 2024-07-04 PROCEDURE — 99204 OFFICE O/P NEW MOD 45 MIN: CPT | Performed by: INTERNAL MEDICINE

## 2024-07-04 PROCEDURE — 73610 X-RAY EXAM OF ANKLE: CPT | Mod: TC | Performed by: RADIOLOGY

## 2024-07-04 RX ORDER — DESVENLAFAXINE 50 MG/1
50 TABLET, FILM COATED, EXTENDED RELEASE ORAL DAILY
COMMUNITY

## 2024-07-04 ASSESSMENT — PAIN SCALES - GENERAL: PAINLEVEL: MODERATE PAIN (4)

## 2024-07-04 NOTE — PROGRESS NOTES
ASSESSMENT AND PLAN:      ICD-10-CM    1. Sprain of other ligament of right ankle, initial encounter  S93.491A Ankle/Foot Bracing Supplies Order Ankle Brace; Right      2. Ankle injury, right, initial encounter  S99.911A XR Ankle Right G/E 3 Views     Ankle/Foot Bracing Supplies Order Ankle Brace; Right      3. Injury while playing basketball  Y93.67 XR Ankle Right G/E 3 Views     Ankle/Foot Bracing Supplies Order Ankle Brace; Right          PLAN:  MS Injury/Pain  ice, elevate, rest, splint: tie ankle splint, and Ibuprofen    Patient Instructions       X-ray - no fracture       Return if symptoms worsen or fail to improve.        Jes Valdivia MD  [facility]    Subjective     Sadie Bishop is a 33 year old who presents for Patient presents with:  Urgent Care  Trauma: Pt reports she hurt her R ankle while playing basketball landed on it wrong, heard a pop x yesterday, painful to walk on, soreness and some swelling- poss sprain   Taking Ibuprofen every few hours and iced area     a new patient of [facility].    MS Injury/Pain    Onset of symptoms was 1 day(s) ago.  Location: right ankle  Context:       The injury happened while playing      Mechanism: sports related injury, inversion      Patient experienced immediate pain, was able to bear weight directly after injury, continued to play basketball  Course of symptoms is worsening.      Current and Associated symptoms: Pain, Swelling, Bruising, and Decreased range of motion    Aggravating Factors: weight-bearing and flexion/extension  Therapies to improve symptoms include: ice, ibuprofen, and elevation  This is the first time this type of problem has occurred for this patient.   Hx twisted ankle    Review of Systems        Objective    /72 (BP Location: Left arm, Patient Position: Sitting, Cuff Size: Adult Regular)   Pulse 91   Temp 98.6  F (37  C) (Tympanic)   Resp 17   Wt 75.4 kg (166 lb 3.2 oz)   SpO2 98%   Breastfeeding No    BMI 24.19 kg/m    Physical Exam  Vitals reviewed.   Constitutional:       Appearance: Normal appearance.   Musculoskeletal:      Comments: Exam of the injured ankle reveals swelling and tenderness over the lateral malleolus. No tenderness over the medial aspect of the ankle. The fifth metatarsal is not tender. The ankle joint is intact without excessive opening on stressing.. The rest of the foot, ankle and leg exam is normal.     Neurological:      Mental Status: She is alert.            Xray - Reviewed and interpreted by me.  No fracture

## 2024-08-28 DIAGNOSIS — F41.1 GENERALIZED ANXIETY DISORDER: ICD-10-CM

## 2024-08-29 RX ORDER — DESVENLAFAXINE 50 MG/1
50 TABLET, FILM COATED, EXTENDED RELEASE ORAL DAILY
Qty: 90 TABLET | Refills: 0 | Status: SHIPPED | OUTPATIENT
Start: 2024-08-29 | End: 2024-11-27

## 2024-08-29 NOTE — TELEPHONE ENCOUNTER
Med: Desvenlafaxine     LOV (related): 3/1/24      Due for F/U around: 2-3 VV appt     Next Appt: none            7/6/2023     1:12 PM 10/23/2023    12:56 PM 3/1/2024     4:25 PM   PHQ   PHQ-9 Total Score 7 2 3   Q9: Thoughts of better off dead/self-harm past 2 weeks Not at all Not at all Not at all           7/6/2023     1:12 PM 10/23/2023    12:55 PM 3/1/2024     4:25 PM   NICHOLE-7 SCORE   Total Score  5 (mild anxiety)    Total Score 5 5 5

## 2024-11-26 DIAGNOSIS — F41.1 GENERALIZED ANXIETY DISORDER: ICD-10-CM

## 2024-11-26 RX ORDER — DESVENLAFAXINE 50 MG/1
50 TABLET, FILM COATED, EXTENDED RELEASE ORAL DAILY
Qty: 90 TABLET | Refills: 0 | Status: SHIPPED | OUTPATIENT
Start: 2024-11-26 | End: 2025-02-24

## 2025-02-28 DIAGNOSIS — F41.1 GENERALIZED ANXIETY DISORDER: ICD-10-CM

## 2025-03-03 RX ORDER — DESVENLAFAXINE 50 MG/1
50 TABLET, FILM COATED, EXTENDED RELEASE ORAL DAILY
Qty: 30 TABLET | Refills: 0 | Status: SHIPPED | OUTPATIENT
Start: 2025-03-03 | End: 2025-06-01

## 2025-03-03 NOTE — CONFIDENTIAL NOTE
[employer] MESSAGE SENT THAT PATIENT IS DUE FOR APPT     Met with patient and family. OK to follow with Casey Flynn now and see us prn. Also advised I am retiring end of June

## 2025-03-17 DIAGNOSIS — Z30.41 ENCOUNTER FOR SURVEILLANCE OF CONTRACEPTIVE PILLS: ICD-10-CM

## 2025-03-18 RX ORDER — NORETHINDRONE ACETATE AND ETHINYL ESTRADIOL AND FERROUS FUMARATE 1.5-30(21)
1 KIT ORAL DAILY
Qty: 84 TABLET | Refills: 0 | Status: SHIPPED | OUTPATIENT
Start: 2025-03-18

## 2025-03-18 NOTE — CONFIDENTIAL NOTE
Med: DEWEY LEVY (related): 3/1/24 - ANXIETY - NOT RELATED      Due for F/U around: OVERDUE FOR CPX    Next Appt: 4/4/25 - CPX

## 2025-03-25 DIAGNOSIS — F41.1 GENERALIZED ANXIETY DISORDER: ICD-10-CM

## 2025-03-26 NOTE — TELEPHONE ENCOUNTER
Med: Pristiq    LOV (related): 3/1/2024    Due for F/U around: 3/22/2024, also overdue for CPX as of 10/20/2023.    Next Appt: 4/14/2025 for CPX.            7/6/2023     1:12 PM 10/23/2023    12:56 PM 3/1/2024     4:25 PM   PHQ   PHQ-9 Total Score 7 2 3   Q9: Thoughts of better off dead/self-harm past 2 weeks Not at all Not at all Not at all           7/6/2023     1:12 PM 10/23/2023    12:55 PM 3/1/2024     4:25 PM   NICHOLE-7 SCORE   Total Score  5 (mild anxiety)    Total Score 5 5 5

## 2025-03-27 RX ORDER — DESVENLAFAXINE 50 MG/1
50 TABLET, FILM COATED, EXTENDED RELEASE ORAL DAILY
Qty: 30 TABLET | Refills: 0 | Status: SHIPPED | OUTPATIENT
Start: 2025-03-27 | End: 2025-04-26

## 2025-04-17 SDOH — HEALTH STABILITY: PHYSICAL HEALTH: ON AVERAGE, HOW MANY DAYS PER WEEK DO YOU ENGAGE IN MODERATE TO STRENUOUS EXERCISE (LIKE A BRISK WALK)?: 3 DAYS

## 2025-04-17 SDOH — HEALTH STABILITY: PHYSICAL HEALTH: ON AVERAGE, HOW MANY MINUTES DO YOU ENGAGE IN EXERCISE AT THIS LEVEL?: 60 MIN

## 2025-04-17 ASSESSMENT — SOCIAL DETERMINANTS OF HEALTH (SDOH): HOW OFTEN DO YOU GET TOGETHER WITH FRIENDS OR RELATIVES?: THREE TIMES A WEEK

## 2025-04-17 NOTE — PATIENT INSTRUCTIONS
Patient Education   Preventive Care Advice   This is general advice given by our system to help you stay healthy. However, your care team may have specific advice just for you. Please talk to your care team about your preventive care needs.  Nutrition  Eat 5 or more servings of fruits and vegetables each day.  Try wheat bread, brown rice and whole grain pasta (instead of white bread, rice, and pasta).  Get enough calcium and vitamin D. Check the label on foods and aim for 100% of the RDA (recommended daily allowance).  Lifestyle  Exercise at least 150 minutes each week  (30 minutes a day, 5 days a week).  Do muscle strengthening activities 2 days a week. These help control your weight and prevent disease.  No smoking.  Wear sunscreen to prevent skin cancer.  Have a dental exam and cleaning every 6 months.  Yearly exams  See your health care team every year to talk about:  Any changes in your health.  Any medicines your care team has prescribed.  Preventive care, family planning, and ways to prevent chronic diseases.  Shots (vaccines)   HPV shots (up to age 26), if you've never had them before.  Hepatitis B shots (up to age 59), if you've never had them before.  COVID-19 shot: Get this shot when it's due.  Flu shot: Get a flu shot every year.  Tetanus shot: Get a tetanus shot every 10 years.  Pneumococcal, hepatitis A, and RSV shots: Ask your care team if you need these based on your risk.  Shingles shot (for age 50 and up)  General health tests  Diabetes screening:  Starting at age 35, Get screened for diabetes at least every 3 years.  If you are younger than age 35, ask your care team if you should be screened for diabetes.  Cholesterol test: At age 39, start having a cholesterol test every 5 years, or more often if advised.  Bone density scan (DEXA): At age 50, ask your care team if you should have this scan for osteoporosis (brittle bones).  Hepatitis C: Get tested at least once in your life.  STIs (sexually  transmitted infections)  Before age 24: Ask your care team if you should be screened for STIs.  After age 24: Get screened for STIs if you're at risk. You are at risk for STIs (including HIV) if:  You are sexually active with more than one person.  You don't use condoms every time.  You or a partner was diagnosed with a sexually transmitted infection.  If you are at risk for HIV, ask about PrEP medicine to prevent HIV.  Get tested for HIV at least once in your life, whether you are at risk for HIV or not.  Cancer screening tests  Cervical cancer screening: If you have a cervix, begin getting regular cervical cancer screening tests starting at age 21.  Breast cancer scan (mammogram): If you've ever had breasts, begin having regular mammograms starting at age 40. This is a scan to check for breast cancer.  Colon cancer screening: It is important to start screening for colon cancer at age 45.  Have a colonoscopy test every 10 years (or more often if you're at risk) Or, ask your provider about stool tests like a FIT test every year or Cologuard test every 3 years.  To learn more about your testing options, visit:   .  For help making a decision, visit:   https://bit.ly/zw53483.  Prostate cancer screening test: If you have a prostate, ask your care team if a prostate cancer screening test (PSA) at age 55 is right for you.  Lung cancer screening: If you are a current or former smoker ages 50 to 80, ask your care team if ongoing lung cancer screenings are right for you.  For informational purposes only. Not to replace the advice of your health care provider. Copyright   2023 [employer]. All rights reserved. Clinically reviewed by the [facility] Transitions Program. [employer] [id number] - REV 01/24.

## 2025-04-21 ENCOUNTER — OFFICE VISIT (OUTPATIENT)
Dept: FAMILY MEDICINE | Facility: CLINIC | Age: 35
End: 2025-04-21

## 2025-04-21 VITALS
WEIGHT: 176.4 LBS | SYSTOLIC BLOOD PRESSURE: 118 MMHG | DIASTOLIC BLOOD PRESSURE: 76 MMHG | BODY MASS INDEX: 25.25 KG/M2 | HEIGHT: 70 IN | HEART RATE: 77 BPM | OXYGEN SATURATION: 96 %

## 2025-04-21 DIAGNOSIS — F41.1 GENERALIZED ANXIETY DISORDER: ICD-10-CM

## 2025-04-21 DIAGNOSIS — Z13.1 SCREENING FOR DIABETES MELLITUS: ICD-10-CM

## 2025-04-21 DIAGNOSIS — Z13.6 SCREENING FOR CARDIOVASCULAR CONDITION: ICD-10-CM

## 2025-04-21 DIAGNOSIS — Z30.41 ENCOUNTER FOR SURVEILLANCE OF CONTRACEPTIVE PILLS: ICD-10-CM

## 2025-04-21 DIAGNOSIS — Z00.00 ROUTINE GENERAL MEDICAL EXAMINATION AT A HEALTH CARE FACILITY: Primary | ICD-10-CM

## 2025-04-21 LAB
ANION GAP SERPL CALCULATED.3IONS-SCNC: 8 MMOL/L (ref 7–15)
BUN SERPL-MCNC: 12.6 MG/DL (ref 6–20)
CALCIUM SERPL-MCNC: 8.6 MG/DL (ref 8.8–10.4)
CHLORIDE SERPL-SCNC: 104 MMOL/L (ref 98–107)
CHOLESTEROL: 193 MG/DL (ref 100–199)
CREAT SERPL-MCNC: 0.81 MG/DL (ref 0.51–0.95)
EGFRCR SERPLBLD CKD-EPI 2021: >90 ML/MIN/1.73M2
FASTING STATUS PATIENT QL REPORTED: YES
FASTING?: YES
GLUCOSE SERPL-MCNC: 91 MG/DL (ref 70–99)
HCO3 SERPL-SCNC: 24 MMOL/L (ref 22–29)
HDL (RMG): 54 MG/DL (ref 40–?)
LDL CALCULATED (RMG): 107 MG/DL (ref 0–130)
POTASSIUM SERPL-SCNC: 4 MMOL/L (ref 3.4–5.3)
SODIUM SERPL-SCNC: 136 MMOL/L (ref 135–145)
TRIGLYCERIDES (RMG): 163 MG/DL (ref 0–149)

## 2025-04-21 PROCEDURE — 3078F DIAST BP <80 MM HG: CPT

## 2025-04-21 PROCEDURE — 80048 BASIC METABOLIC PNL TOTAL CA: CPT | Mod: 90

## 2025-04-21 PROCEDURE — 82310 ASSAY OF CALCIUM: CPT

## 2025-04-21 PROCEDURE — 99395 PREV VISIT EST AGE 18-39: CPT

## 2025-04-21 PROCEDURE — 3074F SYST BP LT 130 MM HG: CPT

## 2025-04-21 PROCEDURE — 80048 BASIC METABOLIC PNL TOTAL CA: CPT

## 2025-04-21 PROCEDURE — 36415 COLL VENOUS BLD VENIPUNCTURE: CPT

## 2025-04-21 PROCEDURE — 80061 LIPID PANEL: CPT | Mod: QW

## 2025-04-21 RX ORDER — DESVENLAFAXINE 50 MG/1
50 TABLET, FILM COATED, EXTENDED RELEASE ORAL DAILY
Qty: 90 TABLET | Refills: 3 | Status: SHIPPED | OUTPATIENT
Start: 2025-04-21

## 2025-04-21 RX ORDER — NORETHINDRONE ACETATE AND ETHINYL ESTRADIOL 1.5-30(21)
1 KIT ORAL DAILY
Qty: 84 TABLET | Refills: 3 | Status: SHIPPED | OUTPATIENT
Start: 2025-04-21

## 2025-04-21 ASSESSMENT — PATIENT HEALTH QUESTIONNAIRE - PHQ9
5. POOR APPETITE OR OVEREATING: NOT AT ALL
SUM OF ALL RESPONSES TO PHQ QUESTIONS 1-9: 3

## 2025-04-21 ASSESSMENT — ANXIETY QUESTIONNAIRES
GAD7 TOTAL SCORE: 3
1. FEELING NERVOUS, ANXIOUS, OR ON EDGE: SEVERAL DAYS
3. WORRYING TOO MUCH ABOUT DIFFERENT THINGS: SEVERAL DAYS
7. FEELING AFRAID AS IF SOMETHING AWFUL MIGHT HAPPEN: NOT AT ALL
GAD7 TOTAL SCORE: 3
IF YOU CHECKED OFF ANY PROBLEMS ON THIS QUESTIONNAIRE, HOW DIFFICULT HAVE THESE PROBLEMS MADE IT FOR YOU TO DO YOUR WORK, TAKE CARE OF THINGS AT HOME, OR GET ALONG WITH OTHER PEOPLE: SOMEWHAT DIFFICULT
6. BECOMING EASILY ANNOYED OR IRRITABLE: SEVERAL DAYS
2. NOT BEING ABLE TO STOP OR CONTROL WORRYING: NOT AT ALL
5. BEING SO RESTLESS THAT IT IS HARD TO SIT STILL: NOT AT ALL

## 2025-04-21 NOTE — PROGRESS NOTES
"Preventive Care Visit  [facility]  Giselle Talbot, GURVINDER CNP, Family Medicine  Apr 21, 2025      Assessment & Plan     Routine general medical examination at a health care facility  Age-appropriate preventative health maintenance along with diet, exercise and healthy weight discussed.     Generalized anxiety disorder  Taking desvenlafaxine 50 mg daily. Stable/controlled. Continue current medication regimen.  - desvenlafaxine (PRISTIQ) 50 MG 24 hr tablet  Dispense: 90 tablet; Refill: 3    Encounter for surveillance of contraceptive pills  Stable. Continue current medication regimen.   - norethindrone-ethinyl estradiol-iron (DEWEY FE 1.5/30) 1.5-30 MG-MCG tablet  Dispense: 84 tablet; Refill: 3    Screening for cardiovascular condition  - Lipid Profile ([facility])  - VENOUS COLLECTION    Screening for diabetes mellitus  - VENOUS COLLECTION  - Basic metabolic panel  - Basic metabolic panel        Patient has been advised of split billing requirements and indicates understanding: Yes        BMI  Estimated body mass index is 25.68 kg/m  as calculated from the following:    Height as of this encounter: 1.765 m (5' 9.5\").    Weight as of this encounter: 80 kg (176 lb 6.4 oz).   Weight management plan: Discussed healthy diet and exercise guidelines    Counseling  Appropriate preventive services were addressed with this patient via screening, questionnaire, or discussion as appropriate for fall prevention, nutrition, physical activity, Tobacco-use cessation, social engagement, weight loss and cognition.  Checklist reviewing preventive services available has been given to the patient.  Reviewed patient's diet, addressing concerns and/or questions.   She is at risk for lack of exercise and has been provided with information to increase physical activity for the benefit of her well-being.   She is at risk for psychosocial distress and has been provided with information to reduce risk.       Follow-up  Return in about 53 " weeks (around 4/27/2026) for Annual Wellness Visit.    Livia Noel is a 34 year old, presenting for the following:  Physical (Fasting/No concerns ) and Health Maintenance (PAP: UTD, last 9/16/21/Immunizations: Flu and Covid due, will do both today )           HPI    Anxiety: Taking Desvenlafaxine 50 mg daily. Going well        10/23/2023    12:55 PM 3/1/2024     4:25 PM 4/21/2025     8:48 AM   NICOHLE-7 SCORE   Total Score 5 (mild anxiety)     Total Score 5 5 3            10/23/2023    12:56 PM 3/1/2024     4:25 PM 4/21/2025     8:48 AM   PHQ   PHQ-9 Total Score 2 3 3   Q9: Thoughts of better off dead/self-harm past 2 weeks Not at all Not at all Not at all        Taking birth control    Health Maintenance   Vaccines: Covid and flu: declines   Pap: Last 9/16/21 normal      Advance Care Planning    Discussed advance care planning with patient; however, patient declined at this time.        4/17/2025   General Health   How would you rate your overall physical health? Good   Feel stress (tense, anxious, or unable to sleep) To some extent   (!) STRESS CONCERN      4/17/2025   Nutrition   Three or more servings of calcium each day? (!) I DON'T KNOW   Diet: Regular (no restrictions)   How many servings of fruit and vegetables per day? (!) 2-3   How many sweetened beverages each day? 0-1         4/17/2025   Exercise   Days per week of moderate/strenous exercise 3 days   Average minutes spent exercising at this level 60 min         4/17/2025   Social Factors   Frequency of gathering with friends or relatives Three times a week   Worry food won't last until get money to buy more No   Food not last or not have enough money for food? No   Do you have housing? (Housing is defined as stable permanent housing and does not include staying ouside in a car, in a tent, in an abandoned building, in an overnight shelter, or couch-surfing.) Yes   Are you worried about losing your housing? No   Lack of transportation? No   Unable  to get utilities (heat,electricity)? No         4/17/2025   Dental   Dentist two times every year? Yes           Today's PHQ-2 Score:       4/21/2025     8:23 AM   PHQ-2 ( 1999 Pfizer)   Q1: Little interest or pleasure in doing things 0   Q2: Feeling down, depressed or hopeless 0   PHQ-2 Score 0         4/17/2025   Substance Use   Alcohol more than 3/day or more than 7/wk No   Do you use any other substances recreationally? No     Social History     Tobacco Use    Smoking status: Never    Smokeless tobacco: Never   Vaping Use    Vaping status: Never Used   Substance Use Topics    Alcohol use: Yes     Comment: ocassionally    Drug use: No             4/17/2025   Breast Cancer Screening   Family history of breast, colon, or ovarian cancer? Yes         4/17/2025   LAST FHS-7 RESULTS   1st degree relative breast or ovarian cancer No   Any relative bilateral breast cancer Yes   Any male have breast cancer Unknown   Any ONE woman have BOTH breast AND ovarian cancer No   Any woman with breast cancer before 50yrs Unknown   2 or more relatives with breast AND/OR ovarian cancer Unknown   2 or more relatives with breast AND/OR bowel cancer Unknown        Mammogram Screening - Patient under 40 years of age: Routine Mammogram Screening not recommended.         4/17/2025   STI Screening   New sexual partner(s) since last STI/HIV test? No     History of abnormal Pap smear: No - age 30- 64 PAP with HPV every 5 years recommended        9/16/2021     2:37 AM   PAP / HPV   PAP-ABSTRACT See Scanned Document           This result is from an external source.           4/17/2025   Contraception/Family Planning   Questions about contraception or family planning No        Reviewed and updated as needed this visit by Provider   Tobacco  Allergies  Meds  Problems  Med Hx  Surg Hx  Fam Hx            History reviewed. No pertinent past medical history.  Past Surgical History:   Procedure Laterality Date    wisdom teeth       Lab work is  "in process      Review of Systems  Constitutional, HEENT, cardiovascular, pulmonary, GI, , musculoskeletal, neuro, skin, endocrine and psych systems are negative, except as otherwise noted.     Objective    Exam  /76   Pulse 77   Ht 1.765 m (5' 9.5\")   Wt 80 kg (176 lb 6.4 oz)   SpO2 96%   BMI 25.68 kg/m     Estimated body mass index is 25.68 kg/m  as calculated from the following:    Height as of this encounter: 1.765 m (5' 9.5\").    Weight as of this encounter: 80 kg (176 lb 6.4 oz).    Physical Exam  GENERAL: alert and no distress  EYES: Eyes grossly normal to inspection, PERRL and conjunctivae and sclerae normal  HENT: ear canals and TM's normal, nose and mouth without ulcers or lesions  NECK: no adenopathy, no asymmetry, masses, or scars  RESP: lungs clear to auscultation - no rales, rhonchi or wheezes  BREAST: normal without masses, tenderness or nipple discharge and no palpable axillary masses or adenopathy  CV: regular rate and rhythm, normal S1 S2, no S3 or S4, no murmur, click or rub, no peripheral edema  ABDOMEN: soft, nontender, no hepatosplenomegaly, no masses and bowel sounds normal  MS: no gross musculoskeletal defects noted, no edema  SKIN: no suspicious lesions or rashes  NEURO: Normal strength and tone, mentation intact and speech normal  PSYCH: mentation appears normal, affect normal/bright        Signed Electronically by: GURVINDER García CNP    "